# Patient Record
Sex: MALE | Race: WHITE | NOT HISPANIC OR LATINO | Employment: FULL TIME | URBAN - METROPOLITAN AREA
[De-identification: names, ages, dates, MRNs, and addresses within clinical notes are randomized per-mention and may not be internally consistent; named-entity substitution may affect disease eponyms.]

---

## 2017-01-09 DIAGNOSIS — I10 ESSENTIAL HYPERTENSION, BENIGN: Primary | ICD-10-CM

## 2017-01-09 NOTE — TELEPHONE ENCOUNTER
lisinopril-hydrochlorothiazide (PRINZIDE,ZESTORETIC) 20-12.5 MG      Last Written Prescription Date: 6/17/16  Last Fill Quantity: 90, # refills: 1  Last Office Visit with G, P or [facility] prescribing provider: 12/16/15       POTASSIUM   Date Value Ref Range Status   12/16/2015 3.8 3.4 - 5.3 mmol/L Final     CREATININE   Date Value Ref Range Status   12/16/2015 0.95 0.66 - 1.25 mg/dL Final     BP Readings from Last 3 Encounters:   03/24/16 132/74   12/16/15 124/76   04/25/14 138/84

## 2017-01-10 RX ORDER — LISINOPRIL AND HYDROCHLOROTHIAZIDE 12.5; 2 MG/1; MG/1
1 TABLET ORAL DAILY
Qty: 30 TABLET | Refills: 0 | Status: SHIPPED | OUTPATIENT
Start: 2017-01-10 | End: 2017-06-06

## 2017-01-10 NOTE — TELEPHONE ENCOUNTER
Medication is being filled for 1 time refill only due to:  Patient needs to be seen because due for yearly visit..      please call to notify.    Shawnee Hernandez RN  [facility]  [phone]

## 2017-06-06 ENCOUNTER — OFFICE VISIT (OUTPATIENT)
Dept: FAMILY MEDICINE | Facility: CLINIC | Age: 42
End: 2017-06-06
Payer: COMMERCIAL

## 2017-06-06 VITALS
DIASTOLIC BLOOD PRESSURE: 60 MMHG | HEIGHT: 70 IN | SYSTOLIC BLOOD PRESSURE: 118 MMHG | BODY MASS INDEX: 45.1 KG/M2 | HEART RATE: 84 BPM | WEIGHT: 315 LBS | TEMPERATURE: 98.8 F

## 2017-06-06 DIAGNOSIS — E66.01 MORBID OBESITY, UNSPECIFIED OBESITY TYPE (H): Primary | ICD-10-CM

## 2017-06-06 DIAGNOSIS — K92.1 BLOOD IN STOOL: ICD-10-CM

## 2017-06-06 DIAGNOSIS — I10 ESSENTIAL HYPERTENSION, BENIGN: ICD-10-CM

## 2017-06-06 DIAGNOSIS — Z13.6 CARDIOVASCULAR SCREENING; LDL GOAL LESS THAN 160: ICD-10-CM

## 2017-06-06 PROCEDURE — 36415 COLL VENOUS BLD VENIPUNCTURE: CPT | Performed by: FAMILY MEDICINE

## 2017-06-06 PROCEDURE — 99214 OFFICE O/P EST MOD 30 MIN: CPT | Performed by: FAMILY MEDICINE

## 2017-06-06 PROCEDURE — 80061 LIPID PANEL: CPT | Performed by: FAMILY MEDICINE

## 2017-06-06 PROCEDURE — 80053 COMPREHEN METABOLIC PANEL: CPT | Performed by: FAMILY MEDICINE

## 2017-06-06 RX ORDER — LISINOPRIL AND HYDROCHLOROTHIAZIDE 12.5; 2 MG/1; MG/1
1 TABLET ORAL DAILY
Qty: 30 TABLET | Refills: 11 | Status: SHIPPED | OUTPATIENT
Start: 2017-06-06 | End: 2018-06-07

## 2017-06-06 NOTE — NURSING NOTE
"Chief Complaint   Patient presents with     Rectal Problem       Initial /60 (BP Location: Right arm, Patient Position: Chair)  Pulse 84  Temp 98.8  F (37.1  C) (Tympanic)  Ht 5' 10\" (1.778 m)  Wt (!) 432 lb (196 kg)  BMI 61.99 kg/m2 Estimated body mass index is 61.99 kg/(m^2) as calculated from the following:    Height as of this encounter: 5' 10\" (1.778 m).    Weight as of this encounter: 432 lb (196 kg).  Medication Reconciliation: complete    Current Outpatient Prescriptions   Medication Sig Dispense Refill     lamoTRIgine (LAMICTAL) 200 MG tablet 100 mg 2 times daily  5     OXcarbazepine (TRILEPTAL) 300 MG tablet 150 mg 2 times daily  4     IBUPROFEN PO Take  by mouth.       lisinopril-hydrochlorothiazide (PRINZIDE/ZESTORETIC) 20-12.5 MG per tablet Take 1 tablet by mouth daily Due for office visit or further refills (Patient not taking: Reported on 6/6/2017) 30 tablet 0       Giuliano M, CMA  "

## 2017-06-06 NOTE — LETTER
[address] [place] [address]   [phone]      June 8, 2017      Bill Acevedo  [address]   [address] [facility] [address]  [place] [address]            Dear Bill,        I have reviewed your recent labs. Here are the results:     -Liver and gallbladder tests are normal. (ALT,AST, Alk phos, bilirubin), kidney function is normal (Cr, GFR), Sodium is normal, Potassium is normal, Calcium is normal, Glucose is normal (diabetes screening test).     -Cholesterol levels (LDL,HDL,  are normal.  ADVISE: rechecking in 1 year.   Triglycerides are slightly high, work on diet. No new medication at this time.  Enclosed is a copy of the results.  If you have any questions or concerns, please call the clinic at [phone] and make a follow up appointment with me.    Results for orders placed or performed in visit on 06/06/17   Comprehensive metabolic panel   Result Value Ref Range    Sodium 141 133 - 144 mmol/L    Potassium 3.8 3.4 - 5.3 mmol/L    Chloride 109 94 - 109 mmol/L    Carbon Dioxide 23 20 - 32 mmol/L    Anion Gap 9 3 - 14 mmol/L    Glucose 89 70 - 99 mg/dL    Urea Nitrogen 10 7 - 30 mg/dL    Creatinine 0.96 0.66 - 1.25 mg/dL    GFR Estimate 86 >60 mL/min/1.7m2    GFR Estimate If Black >90   GFR Calc   >60 mL/min/1.7m2    Calcium 8.2 (L) 8.5 - 10.1 mg/dL    Bilirubin Total 0.8 0.2 - 1.3 mg/dL    Albumin 3.7 3.4 - 5.0 g/dL    Protein Total 7.5 6.8 - 8.8 g/dL    Alkaline Phosphatase 115 40 - 150 U/L    ALT 66 0 - 70 U/L    AST 26 0 - 45 U/L   Lipid Profile (Chol, Trig, HDL, LDL calc)   Result Value Ref Range    Cholesterol 163 <200 mg/dL    Triglycerides 196 (H) <150 mg/dL    HDL Cholesterol 36 (L) >39 mg/dL    LDL Cholesterol Calculated 88 <100 mg/dL    Non HDL Cholesterol 127 <130 mg/dL       Sincerely,    Andrey Stinson M.D.

## 2017-06-06 NOTE — PROGRESS NOTES
SUBJECTIVE:                                                    Bill Acevedo is a 42 year old male who presents to clinic today for the following health issues:      Concern - blood in stool     Onset: 1 week    initially had gastroenteritis symptoms, no recent antibiotics use. Denies any abdominal pain.    No epigastric tenderness.    Description:   Blood noted on toilet paper     Intensity: moderate    Progression of Symptoms:  waxing and waning    Accompanying Signs & Symptoms:  Recent diarrhea/food poisoning          Therapies Tried and outcome: NONE       Hypertension Follow-up  Out of meds x 6 weeks     Outpatient blood pressures are being checked at home.  Results are 138/90s.    Low Salt Diet: low salt       Problem list and histories reviewed & adjusted, as indicated.  Additional history: as documented    Patient Active Problem List   Diagnosis     Essential hypertension, benign     Ankle instability     Ankle pain     CARDIOVASCULAR SCREENING; LDL GOAL LESS THAN 160     Body mass index 50.0-59.9, adult (H)     Bipolar 2 disorder (H)     Vitamin D deficiency     Elevated fasting glucose     Prediabetes     Morbid obesity (H)     Past Surgical History:   Procedure Laterality Date     BIOPSY      Evaluating drug reaction to lamictal       Social History   Substance Use Topics     Smoking status: Never Smoker     Smokeless tobacco: Never Used     Alcohol use 0.0 oz/week     0 Standard drinks or equivalent per week      Comment: occas     Family History   Problem Relation Age of Onset     DIABETES Maternal Grandmother      Allergies Mother      Allergies Sister      Allergies Maternal Grandmother      Arthritis Maternal Grandmother      CANCER Maternal Grandmother      heart attack         Current Outpatient Prescriptions   Medication Sig Dispense Refill     lisinopril-hydrochlorothiazide (PRINZIDE/ZESTORETIC) 20-12.5 MG per tablet Take 1 tablet by mouth daily 30 tablet 11     hydrocortisone (ANUSOL-HC)  "2.5 % cream Place rectally 2 times daily 30 g 0     lamoTRIgine (LAMICTAL) 200 MG tablet 100 mg 2 times daily  5     OXcarbazepine (TRILEPTAL) 300 MG tablet 150 mg 2 times daily  4     IBUPROFEN PO Take  by mouth.       [DISCONTINUED] lisinopril-hydrochlorothiazide (PRINZIDE/ZESTORETIC) 20-12.5 MG per tablet Take 1 tablet by mouth daily Due for office visit or further refills (Patient not taking: Reported on 6/6/2017) 30 tablet 0       Reviewed and updated as needed this visit by clinical staff  Tobacco  Allergies  Meds  Soc Hx      Reviewed and updated as needed this visit by Provider         ROS:  C: NEGATIVE for fever, chills, change in weight  R: NEGATIVE for significant cough or SOB  CV: NEGATIVE for chest pain, palpitations or peripheral edema  GI: POSITIVE for blood in stools    OBJECTIVE:                                                    /60 (BP Location: Right arm, Patient Position: Chair)  Pulse 84  Temp 98.8  F (37.1  C) (Tympanic)  Ht 5' 10\" (1.778 m)  Wt (!) 432 lb (196 kg)  BMI 61.99 kg/m2  Body mass index is 61.99 kg/(m^2).   GENERAL: healthy, alert, well nourished, well hydrated, no distress  ABDOMEN: soft, no tenderness, no  hepatosplenomegaly, no masses, normal bowel sounds  Rectal exam done, no active bleeding, no external Hemorid, rectal tone normal no visible fissure seen.     ASSESSMENT/PLAN:                                                        ICD-10-CM    1. Morbid obesity, unspecified obesity type (H) E66.01 Lipid Profile (Chol, Trig, HDL, LDL calc)   2. Body mass index 50.0-59.9, adult (H) Z68.43    3. Essential hypertension, benign I10 Comprehensive metabolic panel     lisinopril-hydrochlorothiazide (PRINZIDE/ZESTORETIC) 20-12.5 MG per tablet   4. CARDIOVASCULAR SCREENING; LDL GOAL LESS THAN 160 Z13.6    5. Blood in stool K92.1 hydrocortisone (ANUSOL-HC) 2.5 % cream     COLORECTAL SURGERY REFERRAL       Bp stable, he is out of his medication, ordered labs, medication " filled.  Blood in stools, his rectal exam to the extent is normal, differential dicussed, could be small fissure not visible on my exam or internal Hemmorid, advice increase fiber in diet, avoid constipation.  Try Anusol cream to see if it helps with irritation. If this continue follow up with the colon rectal surgery for further evaluation.    Andrey Stinson MD  [facility]

## 2017-06-06 NOTE — MR AVS SNAPSHOT
After Visit Summary   6/6/2017    Bill Acevedo    MRN: [id number]           Patient Information     Date Of Birth          1975        Visit Information        Provider Department      6/6/2017 3:20 PM Andrey Stinson MD [facility] [place]        Today's Diagnoses     Morbid obesity, unspecified obesity type (H)    -  1    Body mass index 50.0-59.9, adult (H)        Essential hypertension, benign        CARDIOVASCULAR SCREENING; LDL GOAL LESS THAN 160        Blood in stool           Follow-ups after your visit        Additional Services     COLORECTAL SURGERY REFERRAL       Your provider has referred you to: FM[facility]: [facility] [phone]   [email or web address]    Referral Reason(s): Rectal Bleeding  Special Concerns: None  This referral is: Elective (week +)  It is OK to leave a message on patient's voicemail.    Please be aware that coverage of these services is subject to the terms and limitations of your health insurance plan.  Call member services at your health plan with any benefit or coverage questions.      Please bring the following with you to your appointment:    (1) Any X-Rays, CTs or MRIs which have been performed.  Contact the facility where they were done to arrange for  prior to your scheduled appointment.    (2) List of current medications  (3) This referral request   (4) Any documents/labs given to you for this referral                  Who to contact     If you have questions or need follow up information about today's clinic visit or your schedule please contact [facility] [place] directly at [phone].  Normal or non-critical lab and imaging results will be communicated to you by MyChart, letter or phone within 4 business days after the clinic has received the results. If you do not hear from us within 7 days, please contact the clinic through MyChart or phone. If you  "have a critical or abnormal lab result, we will notify you by phone as soon as possible.  Submit refill requests through [employer] or call your pharmacy and they will forward the refill request to us. Please allow 3 business days for your refill to be completed.          Additional Information About Your Visit        Revealhart Information     [employer] gives you secure access to your electronic health record. If you see a primary care provider, you can also send messages to your care team and make appointments. If you have questions, please call your primary care clinic.  If you do not have a primary care provider, please call [phone] and they will assist you.        Care EveryWhere ID     This is your Care EveryWhere ID. This could be used by other organizations to access your [facility] medical records  [id number]        Your Vitals Were     Pulse Temperature Height BMI (Body Mass Index)          84 98.8  F (37.1  C) (Tympanic) 5' 10\" (1.778 m) 61.99 kg/m2         Blood Pressure from Last 3 Encounters:   06/06/17 118/60   03/24/16 132/74   12/16/15 124/76    Weight from Last 3 Encounters:   06/06/17 (!) 432 lb (196 kg)   03/24/16 (!) 420 lb (190.5 kg)   12/16/15 (!) 415 lb (188.2 kg)              We Performed the Following     COLORECTAL SURGERY REFERRAL     Comprehensive metabolic panel     Lipid Profile (Chol, Trig, HDL, LDL calc)          Today's Medication Changes          These changes are accurate as of: 6/6/17  3:49 PM.  If you have any questions, ask your nurse or doctor.               Start taking these medicines.        Dose/Directions    hydrocortisone 2.5 % cream   Commonly known as:  ANUSOL-HC   Used for:  Blood in stool   Started by:  Andrey Stinson MD        Place rectally 2 times daily   Quantity:  30 g   Refills:  0         These medicines have changed or have updated prescriptions.        Dose/Directions    lisinopril-hydrochlorothiazide 20-12.5 MG per tablet   Commonly known as:  " PRINZIDE/ZESTORETIC   This may have changed:  additional instructions   Used for:  Essential hypertension, benign   Changed by:  Andrey Stinson MD        Dose:  1 tablet   Take 1 tablet by mouth daily   Quantity:  30 tablet   Refills:  11            Where to get your medicines      These medications were sent to [id number] - [place], [place] - [address] [facility]  [address] [place] [id number]     Phone:  [phone]     hydrocortisone 2.5 % cream    lisinopril-hydrochlorothiazide 20-12.5 MG per tablet                Primary Care Provider Office Phone # Fax #    Andrey Stinson [phone]       [facility] [address] [place] [address] [address]  [place] [address]        Thank you!     Thank you for choosing [facility]  for your care. Our goal is always to provide you with excellent care. Hearing back from our patients is one way we can continue to improve our services. Please take a few minutes to complete the written survey that you may receive in the mail after your visit with us. Thank you!             Your Updated Medication List - Protect others around you: Learn how to safely use, store and throw away your medicines at www.disposemymeds.org.          This list is accurate as of: 6/6/17  3:49 PM.  Always use your most recent med list.                   Brand Name Dispense Instructions for use    hydrocortisone 2.5 % cream    ANUSOL-HC    30 g    Place rectally 2 times daily       IBUPROFEN PO      Take  by mouth.       lamoTRIgine 200 MG tablet    LaMICtal     100 mg 2 times daily       lisinopril-hydrochlorothiazide 20-12.5 MG per tablet    PRINZIDE/ZESTORETIC    30 tablet    Take 1 tablet by mouth daily       OXcarbazepine 300 MG tablet    TRILEPTAL     150 mg 2 times daily

## 2017-06-07 LAB
ALBUMIN SERPL-MCNC: 3.7 G/DL (ref 3.4–5)
ALP SERPL-CCNC: 115 U/L (ref 40–150)
ALT SERPL W P-5'-P-CCNC: 66 U/L (ref 0–70)
ANION GAP SERPL CALCULATED.3IONS-SCNC: 9 MMOL/L (ref 3–14)
AST SERPL W P-5'-P-CCNC: 26 U/L (ref 0–45)
BILIRUB SERPL-MCNC: 0.8 MG/DL (ref 0.2–1.3)
BUN SERPL-MCNC: 10 MG/DL (ref 7–30)
CALCIUM SERPL-MCNC: 8.2 MG/DL (ref 8.5–10.1)
CHLORIDE SERPL-SCNC: 109 MMOL/L (ref 94–109)
CHOLEST SERPL-MCNC: 163 MG/DL
CO2 SERPL-SCNC: 23 MMOL/L (ref 20–32)
CREAT SERPL-MCNC: 0.96 MG/DL (ref 0.66–1.25)
GFR SERPL CREATININE-BSD FRML MDRD: 86 ML/MIN/1.7M2
GLUCOSE SERPL-MCNC: 89 MG/DL (ref 70–99)
HDLC SERPL-MCNC: 36 MG/DL
LDLC SERPL CALC-MCNC: 88 MG/DL
NONHDLC SERPL-MCNC: 127 MG/DL
POTASSIUM SERPL-SCNC: 3.8 MMOL/L (ref 3.4–5.3)
PROT SERPL-MCNC: 7.5 G/DL (ref 6.8–8.8)
SODIUM SERPL-SCNC: 141 MMOL/L (ref 133–144)
TRIGL SERPL-MCNC: 196 MG/DL

## 2018-06-07 DIAGNOSIS — I10 ESSENTIAL HYPERTENSION, BENIGN: ICD-10-CM

## 2018-06-07 RX ORDER — LISINOPRIL AND HYDROCHLOROTHIAZIDE 12.5; 2 MG/1; MG/1
TABLET ORAL
Qty: 30 TABLET | Refills: 0 | Status: SHIPPED | OUTPATIENT
Start: 2018-06-07 | End: 2018-07-11

## 2018-06-07 NOTE — TELEPHONE ENCOUNTER
Medication is being filled for 1 time refill only due to:  Patient needs to be seen because it has been more than one year since last visit.     Caryn Rosen RN- Triage [employer]

## 2018-07-11 ENCOUNTER — OFFICE VISIT (OUTPATIENT)
Dept: FAMILY MEDICINE | Facility: CLINIC | Age: 43
End: 2018-07-11
Payer: COMMERCIAL

## 2018-07-11 VITALS
TEMPERATURE: 98.2 F | OXYGEN SATURATION: 96 % | HEART RATE: 92 BPM | SYSTOLIC BLOOD PRESSURE: 128 MMHG | BODY MASS INDEX: 45.1 KG/M2 | DIASTOLIC BLOOD PRESSURE: 74 MMHG | WEIGHT: 315 LBS | HEIGHT: 70 IN

## 2018-07-11 DIAGNOSIS — E66.01 MORBID OBESITY (H): ICD-10-CM

## 2018-07-11 DIAGNOSIS — Z13.6 CARDIOVASCULAR SCREENING; LDL GOAL LESS THAN 160: ICD-10-CM

## 2018-07-11 DIAGNOSIS — Z00.00 ENCOUNTER FOR ANNUAL PHYSICAL EXAM: Primary | ICD-10-CM

## 2018-07-11 DIAGNOSIS — F31.81 BIPOLAR 2 DISORDER (H): ICD-10-CM

## 2018-07-11 DIAGNOSIS — R73.03 PREDIABETES: ICD-10-CM

## 2018-07-11 DIAGNOSIS — I10 ESSENTIAL HYPERTENSION, BENIGN: ICD-10-CM

## 2018-07-11 PROCEDURE — 80061 LIPID PANEL: CPT | Performed by: FAMILY MEDICINE

## 2018-07-11 PROCEDURE — 36415 COLL VENOUS BLD VENIPUNCTURE: CPT | Performed by: FAMILY MEDICINE

## 2018-07-11 PROCEDURE — 80053 COMPREHEN METABOLIC PANEL: CPT | Performed by: FAMILY MEDICINE

## 2018-07-11 PROCEDURE — 99396 PREV VISIT EST AGE 40-64: CPT | Performed by: FAMILY MEDICINE

## 2018-07-11 RX ORDER — LISINOPRIL AND HYDROCHLOROTHIAZIDE 12.5; 2 MG/1; MG/1
1 TABLET ORAL DAILY
Qty: 30 TABLET | Refills: 11 | Status: SHIPPED | OUTPATIENT
Start: 2018-07-11 | End: 2018-07-20

## 2018-07-11 NOTE — LETTER
July 17, 2018      Bill Acevedo  [address]  [place] [address]        Dear ,      I have reviewed your recent labs. Here are the results:     -Liver and gallbladder tests are normal. (ALT,AST, Alk phos, bilirubin), kidney function is normal (Cr, GFR), Sodium is normal, Potassium is normal, Calcium is normal, Glucose is normal (diabetes screening test).   -Cholesterol levels (LDL,HDL, Triglycerides) are stable.  Triglycerides are mildly elevated.  Your LDL is also  under the goal..  ADVISE: rechecking in 1 year.     Resulted Orders   Lipid panel reflex to direct LDL Fasting   Result Value Ref Range    Cholesterol 178 <200 mg/dL    Triglycerides 174 (H) <150 mg/dL      Comment:      Borderline high:  150-199 mg/dl  High:             200-499 mg/dl  Very high:       >499 mg/dl      HDL Cholesterol 40 >39 mg/dL    LDL Cholesterol Calculated 103 (H) <100 mg/dL      Comment:      Above desirable:  100-129 mg/dl  Borderline High:  130-159 mg/dL  High:             160-189 mg/dL  Very high:       >189 mg/dl      Non HDL Cholesterol 138 (H) <130 mg/dL      Comment:      Above Desirable:  130-159 mg/dl  Borderline high:  160-189 mg/dl  High:             190-219 mg/dl  Very high:       >219 mg/dl     Comprehensive metabolic panel   Result Value Ref Range    Sodium 136 133 - 144 mmol/L    Potassium 3.9 3.4 - 5.3 mmol/L    Chloride 102 94 - 109 mmol/L    Carbon Dioxide 25 20 - 32 mmol/L    Anion Gap 9 3 - 14 mmol/L    Glucose 98 70 - 99 mg/dL    Urea Nitrogen 14 7 - 30 mg/dL    Creatinine 1.03 0.66 - 1.25 mg/dL    GFR Estimate 79 >60 mL/min/1.7m2      Comment:      Non  GFR Calc    GFR Estimate If Black >90 >60 mL/min/1.7m2      Comment:       GFR Calc    Calcium 8.0 (L) 8.5 - 10.1 mg/dL    Bilirubin Total 0.7 0.2 - 1.3 mg/dL    Albumin 3.7 3.4 - 5.0 g/dL    Protein Total 7.3 6.8 - 8.8 g/dL    Alkaline Phosphatase 114 40 - 150 U/L    ALT 69 0 - 70 U/L    AST 23 0  - 45 U/L       If you have any questions or concerns, please call the clinic at the number listed above.       Sincerely,    Andrey Stinson MD

## 2018-07-11 NOTE — PROGRESS NOTES
SUBJECTIVE:   CC: Bill Acevedo is an 43 year old male who presents for preventative health visit.     Physical   Annual:     Getting at least 3 servings of Calcium per day:  Yes    Bi-annual eye exam:  NO    Dental care twice a year:  NO    Sleep apnea or symptoms of sleep apnea:  None    Diet:  Regular (no restrictions)    Frequency of exercise:  6-7 days/week    Duration of exercise:  45-60 minutes    Taking medications regularly:  Yes    Medication side effects:  Not applicable    Additional concerns today:  YES        Patient has history of morbid obesity along with bipolar disorder.  His diet has been not great and sentry life style.  Patient has been slight busy at home with her mother who  needs some medical attention.    Today's PHQ-2 Score:   PHQ-2 ( 1999 [employer]) 7/6/2018   Q1: Little interest or pleasure in doing things 1   Q2: Feeling down, depressed or hopeless 1   PHQ-2 Score 2   Q1: Little interest or pleasure in doing things Several days   Q2: Feeling down, depressed or hopeless Several days   PHQ-2 Score 2       Abuse: Current or Past(Physical, Sexual or Emotional)- No  Do you feel safe in your environment - Yes    Social History   Substance Use Topics     Smoking status: Never Smoker     Smokeless tobacco: Never Used     Alcohol use 0.0 oz/week     0 Standard drinks or equivalent per week      Comment: occas     Alcohol Use 7/6/2018   If you drink alcohol do you typically have greater than 3 drinks per day OR greater than 7 drinks per week? No   No flowsheet data found.    Last PSA: No results found for: PSA    Reviewed orders with patient. Reviewed health maintenance and updated orders accordingly -       Reviewed and updated as needed this visit by clinical staff  Allergies  Meds         Reviewed and updated as needed this visit by Provider         Review of Systems  CONSTITUTIONAL: NEGATIVE for fever, chills, change in weight  INTEGUMENTARY/SKIN: NEGATIVE for worrisome rashes, moles or  lesions  EYES: NEGATIVE for vision changes or irritation  ENT: NEGATIVE for ear, mouth and throat problems  RESP: NEGATIVE for significant cough or SOB  CV: NEGATIVE for chest pain, palpitations or peripheral edema  GI: NEGATIVE for nausea, abdominal pain, heartburn, or change in bowel habits   male: negative for dysuria, hematuria, decreased urinary stream, erectile dysfunction, urethral discharge  MUSCULOSKELETAL: NEGATIVE for significant arthralgias or myalgia  NEURO: NEGATIVE for weakness, dizziness or paresthesias  PSYCHIATRIC: NEGATIVE for changes in mood or affect    OBJECTIVE:   There were no vitals taken for this visit.    Physical Exam  GENERAL: healthy, alert and no distress  EYES: Eyes grossly normal to inspection, PERRL and conjunctivae and sclerae normal  HENT: ear canals and TM's normal, nose and mouth without ulcers or lesions  NECK: no adenopathy, no asymmetry, masses, or scars and thyroid normal to palpation  RESP: lungs clear to auscultation - no rales, rhonchi or wheezes  CV: regular rate and rhythm, normal S1 S2, no S3 or S4, no murmur, click or rub, no peripheral edema and peripheral pulses strong  ABDOMEN: soft, nontender, no hepatosplenomegaly, no masses and bowel sounds normal  MS: no gross musculoskeletal defects noted, no edema  SKIN: no suspicious lesions or rashes  NEURO: Normal strength and tone, mentation intact and speech normal  PSYCH: mentation appears normal, affect normal/bright    Diagnostic Test Results:  none     ASSESSMENT/PLAN:   1. Encounter for annual physical exam    - Lipid panel reflex to direct LDL Fasting  - Comprehensive metabolic panel    2. Morbid obesity (H)  I suggested patient to get a bariatric evaluation to see if there is any possibility.  Referral provided.  - Lipid panel reflex to direct LDL Fasting  - Comprehensive metabolic panel  - BARIATRIC ADULT REFERRAL    3. Essential hypertension, benign  Heart pressure is stable.  I have refilled his  "medication.  - Lipid panel reflex to direct LDL Fasting  - Comprehensive metabolic panel  - lisinopril-hydrochlorothiazide (PRINZIDE/ZESTORETIC) 20-12.5 MG per tablet; Take 1 tablet by mouth daily  Dispense: 30 tablet; Refill: 11    4. Prediabetes    - Lipid panel reflex to direct LDL Fasting  - Comprehensive metabolic panel    5. Bipolar 2 disorder (H)      6. CARDIOVASCULAR SCREENING; LDL GOAL LESS THAN 160        COUNSELING:   Reviewed preventive health counseling, as reflected in patient instructions       Regular exercise       Healthy diet/nutrition    BP Readings from Last 1 Encounters:   06/06/17 118/60     Estimated body mass index is 61.99 kg/(m^2) as calculated from the following:    Height as of 6/6/17: 5' 10\" (1.778 m).    Weight as of 6/6/17: 432 lb (196 kg).      Weight management plan: Patient referred to endocrine and/or weight management specialty     reports that he has never smoked. He has never used smokeless tobacco.      Counseling Resources:  ATP IV Guidelines  Pooled Cohorts Equation Calculator  FRAX Risk Assessment  ICSI Preventive Guidelines  Dietary Guidelines for Americans, 2010  [employer]'s MyPlate  ASA Prophylaxis  Lung CA Screening    Andrey Stinson MD  [facility]  "

## 2018-07-11 NOTE — MR AVS SNAPSHOT
After Visit Summary   7/11/2018    Bill Acevedo    MRN: [id number]           Patient Information     Date Of Birth          1975        Visit Information        Provider Department      7/11/2018 4:40 PM Andrey Stinson MD [facility] [place]        Today's Diagnoses     Encounter for annual physical exam    -  1    Morbid obesity (H)        Essential hypertension, benign        Prediabetes        Bipolar 2 disorder (H)        CARDIOVASCULAR SCREENING; LDL GOAL LESS THAN 160           Follow-ups after your visit        Additional Services     BARIATRIC ADULT REFERRAL       Your provider has referred you to: FM[facility]: [facility] [place] [facility] Lea Elizondo [phone]  [email or web address]    Please be aware that coverage of these services is subject to the terms and limitations of your health insurance plan.  Call member services at your health plan with any benefit or coverage questions.      Please bring the following with you to your appointment:      (1) List of current medications   (2) This referral request   (3) Any documents/labs given to you for this referral                  Follow-up notes from your care team     Return in about 1 year (around 7/11/2019) for Physical Exam.      Who to contact     If you have questions or need follow up information about today's clinic visit or your schedule please contact [facility] [place] directly at [phone].  Normal or non-critical lab and imaging results will be communicated to you by MyChart, letter or phone within 4 business days after the clinic has received the results. If you do not hear from us within 7 days, please contact the clinic through MyChart or phone. If you have a critical or abnormal lab result, we will notify you by phone as soon as possible.  Submit refill requests through sarvaMAIL or call your pharmacy and  "they will forward the refill request to us. Please allow 3 business days for your refill to be completed.          Additional Information About Your Visit        [employer] G[employer] Information     [employer] gives you secure access to your electronic health record. If you see a primary care provider, you can also send messages to your care team and make appointments. If you have questions, please call your primary care clinic.  If you do not have a primary care provider, please call [phone] and they will assist you.        Care EveryWhere ID     This is your Care EveryWhere ID. This could be used by other organizations to access your [place] medical records  [id number]        Your Vitals Were     Pulse Temperature Height Pulse Oximetry BMI (Body Mass Index)       92 98.2  F (36.8  C) (Tympanic) 5' 10\" (1.778 m) 96% 62.99 kg/m2        Blood Pressure from Last 3 Encounters:   07/11/18 128/74   06/06/17 118/60   03/24/16 132/74    Weight from Last 3 Encounters:   07/11/18 (!) 439 lb (199.1 kg)   06/06/17 (!) 432 lb (196 kg)   03/24/16 (!) 420 lb (190.5 kg)              We Performed the Following     BARIATRIC ADULT REFERRAL     Comprehensive metabolic panel     Lipid panel reflex to direct LDL Fasting          Today's Medication Changes          These changes are accurate as of 7/11/18  5:14 PM.  If you have any questions, ask your nurse or doctor.               These medicines have changed or have updated prescriptions.        Dose/Directions    lisinopril-hydrochlorothiazide 20-12.5 MG per tablet   Commonly known as:  PRINZIDE/ZESTORETIC   This may have changed:  See the new instructions.   Used for:  Essential hypertension, benign   Changed by:  Andrey Stinson MD        Dose:  1 tablet   Take 1 tablet by mouth daily   Quantity:  30 tablet   Refills:  11            Where to get your medicines      These medications were sent to [id number] - [facility] [address] " [place] [address]     Phone:  [phone]     lisinopril-hydrochlorothiazide 20-12.5 MG per tablet                Primary Care Provider Office Phone # Fax #    Andrey Stinson [phone]       [address]  [place] [address]        Equal Access to Services     [facility]: Hadii aad ku hadasho Soomaali, waaxda luqadaha, qaybta kaalmada adeegyada, waxay idiin hayaan adeeg kharash la'aan . So [facility] [phone].    ATENCIÓN: Si habla español, tiene a newell disposición servicios gratuitos de asistencia lingüística. Llame al [phone].    We comply with applicable federal civil rights laws and [place] laws. We do not discriminate on the basis of race, color, national origin, age, disability, sex, sexual orientation, or gender identity.            Thank you!     Thank you for choosing [facility] [place]  for your care. Our goal is always to provide you with excellent care. Hearing back from our patients is one way we can continue to improve our services. Please take a few minutes to complete the written survey that you may receive in the mail after your visit with us. Thank you!             Your Updated Medication List - Protect others around you: Learn how to safely use, store and throw away your medicines at www.disposemymeds.org.          This list is accurate as of 7/11/18  5:14 PM.  Always use your most recent med list.                   Brand Name Dispense Instructions for use Diagnosis    IBUPROFEN PO      Take  by mouth.        lamoTRIgine 200 MG tablet    LaMICtal     100 mg 2 times daily        lisinopril-hydrochlorothiazide 20-12.5 MG per tablet    PRINZIDE/ZESTORETIC    30 tablet    Take 1 tablet by mouth daily    Essential hypertension, benign       OXcarbazepine 300 MG tablet    TRILEPTAL     150 mg 2 times daily

## 2018-07-12 LAB
ALBUMIN SERPL-MCNC: 3.7 G/DL (ref 3.4–5)
ALP SERPL-CCNC: 114 U/L (ref 40–150)
ALT SERPL W P-5'-P-CCNC: 69 U/L (ref 0–70)
ANION GAP SERPL CALCULATED.3IONS-SCNC: 9 MMOL/L (ref 3–14)
AST SERPL W P-5'-P-CCNC: 23 U/L (ref 0–45)
BILIRUB SERPL-MCNC: 0.7 MG/DL (ref 0.2–1.3)
BUN SERPL-MCNC: 14 MG/DL (ref 7–30)
CALCIUM SERPL-MCNC: 8 MG/DL (ref 8.5–10.1)
CHLORIDE SERPL-SCNC: 102 MMOL/L (ref 94–109)
CHOLEST SERPL-MCNC: 178 MG/DL
CO2 SERPL-SCNC: 25 MMOL/L (ref 20–32)
CREAT SERPL-MCNC: 1.03 MG/DL (ref 0.66–1.25)
GFR SERPL CREATININE-BSD FRML MDRD: 79 ML/MIN/1.7M2
GLUCOSE SERPL-MCNC: 98 MG/DL (ref 70–99)
HDLC SERPL-MCNC: 40 MG/DL
LDLC SERPL CALC-MCNC: 103 MG/DL
NONHDLC SERPL-MCNC: 138 MG/DL
POTASSIUM SERPL-SCNC: 3.9 MMOL/L (ref 3.4–5.3)
PROT SERPL-MCNC: 7.3 G/DL (ref 6.8–8.8)
SODIUM SERPL-SCNC: 136 MMOL/L (ref 133–144)
TRIGL SERPL-MCNC: 174 MG/DL

## 2018-07-16 DIAGNOSIS — I10 ESSENTIAL HYPERTENSION, BENIGN: ICD-10-CM

## 2018-07-16 RX ORDER — LISINOPRIL AND HYDROCHLOROTHIAZIDE 12.5; 2 MG/1; MG/1
1 TABLET ORAL DAILY
Qty: 30 TABLET | Refills: 11 | Status: CANCELLED | OUTPATIENT
Start: 2018-07-16

## 2018-07-16 NOTE — TELEPHONE ENCOUNTER
"Requested Prescriptions   Pending Prescriptions Disp Refills     lisinopril-hydrochlorothiazide (PRINZIDE/ZESTORETIC) 20-12.5 MG per tablet  Last Written Prescription Date:  7/1/18  Last Fill Quantity: 30,  # refills: 11   Last office visit: 7/11/2018 with prescribing provider:  Shantell   Future Office Visit:     30 tablet 11     Sig: Take 1 tablet by mouth daily    Diuretics (Including Combos) Protocol Passed    7/16/2018 11:13 AM       Passed - Blood pressure under 140/90 in past 12 months    BP Readings from Last 3 Encounters:   07/11/18 128/74   06/06/17 118/60   03/24/16 132/74                Passed - Recent (12 mo) or future (30 days) visit within the authorizing provider's specialty    Patient had office visit in the last 12 months or has a visit in the next 30 days with authorizing provider or within the authorizing provider's specialty.  See \"Patient Info\" tab in inbasket, or \"Choose Columns\" in Meds & Orders section of the refill encounter.           Passed - Patient is age 18 or older       Passed - Normal serum creatinine on file in past 12 months    Recent Labs   Lab Test  07/11/18   1701   CR  1.03             Passed - Normal serum potassium on file in past 12 months    Recent Labs   Lab Test  07/11/18   1701   POTASSIUM  3.9                   Passed - Normal serum sodium on file in past 12 months    Recent Labs   Lab Test  07/11/18   1701   NA  136                "

## 2018-07-16 NOTE — TELEPHONE ENCOUNTER
"Requested Prescriptions   Pending Prescriptions Disp Refills     lisinopril-hydrochlorothiazide (PRINZIDE/ZESTORETIC) 20-12.5 MG per tablet  Last Written Prescription Date:  7-  Last Fill Quantity: 30 tablet,  # refills: 11   Last office visit: 7/11/2018 with prescribing provider:  7-  Future Office Visit:     30 tablet 11     Sig: Take 1 tablet by mouth daily    Diuretics (Including Combos) Protocol Passed    7/16/2018  2:21 PM       Passed - Blood pressure under 140/90 in past 12 months    BP Readings from Last 3 Encounters:   07/11/18 128/74   06/06/17 118/60   03/24/16 132/74                Passed - Recent (12 mo) or future (30 days) visit within the authorizing provider's specialty    Patient had office visit in the last 12 months or has a visit in the next 30 days with authorizing provider or within the authorizing provider's specialty.  See \"Patient Info\" tab in inbasket, or \"Choose Columns\" in Meds & Orders section of the refill encounter.           Passed - Patient is age 18 or older       Passed - Normal serum creatinine on file in past 12 months    Recent Labs   Lab Test  07/11/18   1701   CR  1.03             Passed - Normal serum potassium on file in past 12 months    Recent Labs   Lab Test  07/11/18   1701   POTASSIUM  3.9                   Passed - Normal serum sodium on file in past 12 months    Recent Labs   Lab Test  07/11/18   1701   NA  136                "

## 2018-07-17 NOTE — TELEPHONE ENCOUNTER
Patient has refills remaining with pharmacy.  Lorin Akhtar RN - Triage  [facility]

## 2018-07-20 DIAGNOSIS — I10 ESSENTIAL HYPERTENSION, BENIGN: ICD-10-CM

## 2018-07-20 NOTE — TELEPHONE ENCOUNTER
"Requested Prescriptions   Pending Prescriptions Disp Refills     lisinopril-hydrochlorothiazide (PRINZIDE/ZESTORETIC) 20-12.5 MG per tablet 30 tablet 11    Last Written Prescription Date:  7/11/18  Last Fill Quantity: 30,  # refills: 11   Last office visit: 7/11/2018 with prescribing provider:  YES    Future Office Visit:     Sig: Take 1 tablet by mouth daily    Diuretics (Including Combos) Protocol Passed    7/20/2018 10:19 AM       Passed - Blood pressure under 140/90 in past 12 months    BP Readings from Last 3 Encounters:   07/11/18 128/74   06/06/17 118/60   03/24/16 132/74                Passed - Recent (12 mo) or future (30 days) visit within the authorizing provider's specialty    Patient had office visit in the last 12 months or has a visit in the next 30 days with authorizing provider or within the authorizing provider's specialty.  See \"Patient Info\" tab in inbasket, or \"Choose Columns\" in Meds & Orders section of the refill encounter.           Passed - Patient is age 18 or older       Passed - Normal serum creatinine on file in past 12 months    Recent Labs   Lab Test  07/11/18   1701   CR  1.03             Passed - Normal serum potassium on file in past 12 months    Recent Labs   Lab Test  07/11/18   1701   POTASSIUM  3.9                   Passed - Normal serum sodium on file in past 12 months    Recent Labs   Lab Test  07/11/18   1701   NA  136                "

## 2018-07-22 RX ORDER — LISINOPRIL AND HYDROCHLOROTHIAZIDE 12.5; 2 MG/1; MG/1
1 TABLET ORAL DAILY
Qty: 90 TABLET | Refills: 3 | Status: SHIPPED | OUTPATIENT
Start: 2018-07-22 | End: 2019-09-09

## 2018-11-06 ENCOUNTER — OFFICE VISIT (OUTPATIENT)
Dept: ORTHOPEDICS | Facility: CLINIC | Age: 43
End: 2018-11-06
Payer: COMMERCIAL

## 2018-11-06 ENCOUNTER — RADIANT APPOINTMENT (OUTPATIENT)
Dept: GENERAL RADIOLOGY | Facility: CLINIC | Age: 43
End: 2018-11-06
Attending: FAMILY MEDICINE
Payer: COMMERCIAL

## 2018-11-06 VITALS
SYSTOLIC BLOOD PRESSURE: 128 MMHG | WEIGHT: 315 LBS | DIASTOLIC BLOOD PRESSURE: 74 MMHG | HEIGHT: 70 IN | BODY MASS INDEX: 45.1 KG/M2

## 2018-11-06 DIAGNOSIS — M17.11 PRIMARY OSTEOARTHRITIS OF RIGHT KNEE: ICD-10-CM

## 2018-11-06 DIAGNOSIS — M25.562 ACUTE PAIN OF LEFT KNEE: ICD-10-CM

## 2018-11-06 DIAGNOSIS — M25.562 ACUTE PAIN OF LEFT KNEE: Primary | ICD-10-CM

## 2018-11-06 DIAGNOSIS — M17.12 PRIMARY OSTEOARTHRITIS OF LEFT KNEE: ICD-10-CM

## 2018-11-06 PROCEDURE — 99203 OFFICE O/P NEW LOW 30 MIN: CPT | Mod: 25 | Performed by: FAMILY MEDICINE

## 2018-11-06 PROCEDURE — 73562 X-RAY EXAM OF KNEE 3: CPT | Mod: FY | Performed by: FAMILY MEDICINE

## 2018-11-06 PROCEDURE — 20610 DRAIN/INJ JOINT/BURSA W/O US: CPT | Mod: 50 | Performed by: FAMILY MEDICINE

## 2018-11-06 RX ORDER — LIDOCAINE HYDROCHLORIDE 10 MG/ML
4 INJECTION, SOLUTION INFILTRATION; PERINEURAL
Status: SHIPPED | OUTPATIENT
Start: 2018-11-06

## 2018-11-06 RX ORDER — METHYLPREDNISOLONE ACETATE 40 MG/ML
40 INJECTION, SUSPENSION INTRA-ARTICULAR; INTRALESIONAL; INTRAMUSCULAR; SOFT TISSUE
Status: SHIPPED | OUTPATIENT
Start: 2018-11-06

## 2018-11-06 RX ADMIN — LIDOCAINE HYDROCHLORIDE 4 ML: 10 INJECTION, SOLUTION INFILTRATION; PERINEURAL at 17:07

## 2018-11-06 RX ADMIN — METHYLPREDNISOLONE ACETATE 40 MG: 40 INJECTION, SUSPENSION INTRA-ARTICULAR; INTRALESIONAL; INTRAMUSCULAR; SOFT TISSUE at 17:07

## 2018-11-06 NOTE — MR AVS SNAPSHOT
"              After Visit Summary   11/6/2018    Bill Acevedo    MRN: [id number]           Patient Information     Date Of Birth          1975        Visit Information        Provider Department      11/6/2018 4:40 PM Shin Gee MD [facility] [place]        Today's Diagnoses     Acute pain of left knee    -  1    Primary osteoarthritis of left knee        Primary osteoarthritis of right knee           Follow-ups after your visit        Who to contact     If you have questions or need follow up information about today's clinic visit or your schedule please contact [facility] [place] directly at [phone].  Normal or non-critical lab and imaging results will be communicated to you by MyChart, letter or phone within 4 business days after the clinic has received the results. If you do not hear from us within 7 days, please contact the clinic through [facility] Homebasehart or phone. If you have a critical or abnormal lab result, we will notify you by phone as soon as possible.  Submit refill requests through MyNewPlace or call your pharmacy and they will forward the refill request to us. Please allow 3 business days for your refill to be completed.          Additional Information About Your Visit        MyChart Information     [employer] gives you secure access to your electronic health record. If you see a primary care provider, you can also send messages to your care team and make appointments. If you have questions, please call your primary care clinic.  If you do not have a primary care provider, please call [phone] and they will assist you.        Care EveryWhere ID     This is your Care EveryWhere ID. This could be used by other organizations to access your [place] medical records  [id number]        Your Vitals Were     Height BMI (Body Mass Index)                5' 10\" (1.778 m) 62.99 kg/m2           Blood Pressure from Last 3 Encounters: "   11/06/18 128/74   07/11/18 128/74   06/06/17 118/60    Weight from Last 3 Encounters:   11/06/18 (!) 439 lb (199.1 kg)   07/11/18 (!) 439 lb (199.1 kg)   06/06/17 (!) 432 lb (196 kg)              We Performed the Following     Large Joint Injection/Arthocentesis        Primary Care Provider Office Phone # Fax #    Andrey Stinson [phone]       [address]  [place] [address]        Equal Access to Services     [facility]: Hadii aad ku hadasho Soomaali, waaxda luqadaha, qaybta kaalmada adeegyada, myah jaimes . So [facility] [phone].    ATENCIÓN: Si habla español, tiene a newell disposición servicios gratuitos de asistencia lingüística. [facility] [phone].    We comply with applicable federal civil rights laws and [place] laws. We do not discriminate on the basis of race, color, national origin, age, disability, sex, sexual orientation, or gender identity.            Thank you!     Thank you for choosing [facility] [place]  for your care. Our goal is always to provide you with excellent care. Hearing back from our patients is one way we can continue to improve our services. Please take a few minutes to complete the written survey that you may receive in the mail after your visit with us. Thank you!             Your Updated Medication List - Protect others around you: Learn how to safely use, store and throw away your medicines at www.disposemymeds.org.          This list is accurate as of 11/6/18  6:47 PM.  Always use your most recent med list.                   Brand Name Dispense Instructions for use Diagnosis    IBUPROFEN PO      Take  by mouth.        lamoTRIgine 200 MG tablet    LaMICtal     100 mg 2 times daily        lisinopril-hydrochlorothiazide 20-12.5 MG per tablet    PRINZIDE/ZESTORETIC    90 tablet    Take 1 tablet by mouth daily    Essential hypertension, benign       OXcarbazepine 300 MG tablet    TRILEPTAL      150 mg 2 times daily

## 2018-11-06 NOTE — LETTER
11/6/2018         RE: Bill Acevedo  [address]  [place] [facility] [address]        Dear Colleague,    Thank you for referring your patient, Bill Acevedo, to the [facility] [place]. Please see a copy of my visit note below.    HPI   [facility]   Clinic Visit s Nov 6, 2018    PCP: Andrey Stinson      Bill is a 43 year old male who is seen as self referral for   Chief Complaint   Patient presents with     Left Knee - Pain       Injury: Patient describes injury as getting off a stool and landed on his knee.        Location of Pain: left knee deep anterior , nonradiating   Duration of Pain: 4 week(s)  Rating of Pain at worst: 8/10  Rating of Pain Currently: 6/10  Pain is better with: activity avoidance   Pain is worse with: walking, stairs, stretching the knee,  kneeling  Treatment so far consists of: brace, heat, ice and rest, ibuprofen   Associated symptoms:  Swelling, bruising and deformity  Recent imaging completed: No recent imaging completed.  Prior History of related problems: previous power , right knee issues, bilateral ankle issues    Very active laterally, on feet moving mother's apartment, lifting hauling and climbing stairs regularly making knee worse.     Social History: is employed as a/an desk work      Past Medical History:   Diagnosis Date     Depressive disorder     Bi-Polar, Medicated     Hypertension        Patient Active Problem List    Diagnosis Date Noted     Morbid obesity (H) 06/06/2017     Priority: Medium     Prediabetes 04/11/2013     Priority: Medium     Bipolar 2 disorder (H) 03/20/2013     Priority: Medium     Vitamin D deficiency 03/20/2013     Priority: Medium     Problem list name updated by automated process. Provider to review       Elevated fasting glucose 03/20/2013     Priority: Medium     Body mass index 50.0-59.9, adult (H) 11/17/2011     Priority: Medium     CARDIOVASCULAR SCREENING; LDL GOAL  "LESS THAN 160 10/31/2010     Priority: Medium     Ankle instability 05/17/2010     Priority: Medium     Ankle pain 05/17/2010     Priority: Medium     Essential hypertension, benign 04/29/2010     Priority: Medium       Family History   Problem Relation Age of Onset     Diabetes Maternal Grandmother      Allergies Mother      Allergies Sister      Allergies Maternal Grandmother      Arthritis Maternal Grandmother      Cancer Maternal Grandmother      heart attack       Social History     Social History     Marital status: Single     Spouse name: N/A     Number of children: N/A     Years of education: N/A     Social History Main Topics     Smoking status: Never Smoker     Smokeless tobacco: Never Used     Alcohol use 0.0 oz/week     0 Standard drinks or equivalent per week      Comment: occas       Past Surgical History:   Procedure Laterality Date     BIOPSY      Evaluating drug reaction to lamictal       Review of Systems   Musculoskeletal: Positive for joint pain.   All other systems reviewed and are negative.        Physical Exam   Musculoskeletal:        Right knee: Medial joint line tenderness noted.        Left knee: Medial joint line tenderness noted.     /74  Ht 5' 10\" (1.778 m)  Wt (!) 439 lb (199.1 kg)  BMI 62.99 kg/m2  Constitutional:well-developed, well-nourished, and in no distress. obese,  Cardiovascular: Intact distal pulses.    Neurological: alert. Gait Normal:   Gait, station, stance, and balance appear normal for age  Skin: Skin is warm and dry.   Psychiatric: Mood and affect normal.   Respiratory: unlabored, speaks in full sentences  Lymph: no LAD, no lymphangitis          Left Knee Exam   Swelling: Mild  Effusion: Yes    Tenderness   The patient is experiencing tenderness in the medial joint line.    Range of Motion   Extension: Normal  Flexion:     Normal    Tests   Drawer:       Anterior - Negative     Posterior - Negative  Varus:  Negative  Valgus: Negative  Patellar Apprehension: " No    Right Knee Exam   Swelling: Mild  Effusion: No    Tenderness   The patient is experiencing tenderness in the medial joint line.    Range of Motion   Extension: Normal  Flexion:     Normal    Tests   Drawer:       Anterior - Negative    Posterior - Negative  Varus:  Negative  Valgus: Negative  Patellar Apprehension: No              X-ray images Ordered and independently reviewed by me in the office today with the patient. X-ray shows:   Recent Results (from the past 48 hour(s))   XR Knee Standing AP Bilat [place] Bilat Lat Left    Narrative    11/6/2018    Mild medial joint space narrowing bilaterally, otherwise no significant   arthritis, no fractures, no soft tissue deformities.              ASSESSMENT/PLAN    ICD-10-CM    1. Acute pain of left knee M25.562 XR Knee Standing AP Bilat [place] Bilat Lat Left   2. Primary osteoarthritis of left knee M17.12    3. Primary osteoarthritis of right knee M17.11      Mild bilateral arthritis, slightly worse on the left related to recent direct blow.  Reassurance, otherwise stable knee.  Reviewed nature of degenerative arthritis, discussed options including joint protection strategies and symptom management, including NSAIDS,  acetaminophen on a scheduled and/or as needed basis, joint injection with cortisone or hyaluronic acid derivatives, bracing, glucosamine, maintenance of overall knee stability through strengthening through physical therapy.  Pt opts for  symptom treatment, activity modification/rest and injection therapy    Discussed lifestyles changes to reduce risks of arthritis progression:  Stop using tobacco if any current use.  Lose weight  Exercise safely.    Large Joint Injection/Arthocentesis  Date/Time: 11/6/2018 5:07 PM  Performed by: LEISA CASTORENA  Authorized by: LEISA CASTORENA     Indications:  Pain and osteoarthritis  Needle Size:  25 G  Guidance: landmark guided    Approach:  Superolateral  Location:  Knee  Laterality:   Bilateral  Site:  Bilateral knee  Medications (Right):  4 mL lidocaine 1 %; 40 mg methylPREDNISolone acetate 40 MG/ML  Medications (Left):  4 mL lidocaine 1 %; 40 mg methylPREDNISolone acetate 40 MG/ML  Outcome:  Tolerated well, no immediate complications  Procedure discussed: discussed risks, benefits, and alternatives    Consent Given by:  Patient  Timeout: timeout called immediately prior to procedure    Prep: patient was prepped and draped in usual sterile fashion     LOT [id number] exp 07/2020              Again, thank you for allowing me to participate in the care of your patient.        Sincerely,        Shin Gee MD

## 2018-11-06 NOTE — PROGRESS NOTES
[facility]   Clinic Visit s Nov 6, 2018    PCP: Andrey Stinson      Bill is a 43 year old male who is seen as self referral for   Chief Complaint   Patient presents with     Left Knee - Pain       Injury: Patient describes injury as getting off a stool and landed on his knee.        Location of Pain: left knee deep anterior , nonradiating   Duration of Pain: 4 week(s)  Rating of Pain at worst: 8/10  Rating of Pain Currently: 6/10  Pain is better with: activity avoidance   Pain is worse with: walking, stairs, stretching the knee,  kneeling  Treatment so far consists of: brace, heat, ice and rest, ibuprofen   Associated symptoms:  Swelling, bruising and deformity  Recent imaging completed: No recent imaging completed.  Prior History of related problems: previous power , right knee issues, bilateral ankle issues    Very active laterally, on feet moving mother's apartment, lifting hauling and climbing stairs regularly making knee worse.     Social History: is employed as a/an desk work      Past Medical History:   Diagnosis Date     Depressive disorder     Bi-Polar, Medicated     Hypertension        Patient Active Problem List    Diagnosis Date Noted     Morbid obesity (H) 06/06/2017     Priority: Medium     Prediabetes 04/11/2013     Priority: Medium     Bipolar 2 disorder (H) 03/20/2013     Priority: Medium     Vitamin D deficiency 03/20/2013     Priority: Medium     Problem list name updated by automated process. Provider to review       Elevated fasting glucose 03/20/2013     Priority: Medium     Body mass index 50.0-59.9, adult (H) 11/17/2011     Priority: Medium     CARDIOVASCULAR SCREENING; LDL GOAL LESS THAN 160 10/31/2010     Priority: Medium     Ankle instability 05/17/2010     Priority: Medium     Ankle pain 05/17/2010     Priority: Medium     Essential hypertension, benign 04/29/2010     Priority: Medium       Family History   Problem Relation Age of Onset     Diabetes  "Maternal Grandmother      Allergies Mother      Allergies Sister      Allergies Maternal Grandmother      Arthritis Maternal Grandmother      Cancer Maternal Grandmother      heart attack       Social History     Social History     Marital status: Single     Spouse name: N/A     Number of children: N/A     Years of education: N/A     Social History Main Topics     Smoking status: Never Smoker     Smokeless tobacco: Never Used     Alcohol use 0.0 oz/week     0 Standard drinks or equivalent per week      Comment: occas       Past Surgical History:   Procedure Laterality Date     BIOPSY      Evaluating drug reaction to lamictal       Review of Systems   Musculoskeletal: Positive for joint pain.   All other systems reviewed and are negative.        Physical Exam   Musculoskeletal:        Right knee: Medial joint line tenderness noted.        Left knee: Medial joint line tenderness noted.     /74  Ht 5' 10\" (1.778 m)  Wt (!) 439 lb (199.1 kg)  BMI 62.99 kg/m2  Constitutional:well-developed, well-nourished, and in no distress. obese,  Cardiovascular: Intact distal pulses.    Neurological: alert. Gait Normal:   Gait, station, stance, and balance appear normal for age  Skin: Skin is warm and dry.   Psychiatric: Mood and affect normal.   Respiratory: unlabored, speaks in full sentences  Lymph: no LAD, no lymphangitis          Left Knee Exam   Swelling: Mild  Effusion: Yes    Tenderness   The patient is experiencing tenderness in the medial joint line.    Range of Motion   Extension: Normal  Flexion:     Normal    Tests   Drawer:       Anterior - Negative     Posterior - Negative  Varus:  Negative  Valgus: Negative  Patellar Apprehension: No    Right Knee Exam   Swelling: Mild  Effusion: No    Tenderness   The patient is experiencing tenderness in the medial joint line.    Range of Motion   Extension: Normal  Flexion:     Normal    Tests   Drawer:       Anterior - Negative    Posterior - Negative  Varus:  " Negative  Valgus: Negative  Patellar Apprehension: No              X-ray images Ordered and independently reviewed by me in the office today with the patient. X-ray shows:   Recent Results (from the past 48 hour(s))   XR Knee Standing AP Bilat [place] Bilat Lat Left    Narrative    11/6/2018    Mild medial joint space narrowing bilaterally, otherwise no significant   arthritis, no fractures, no soft tissue deformities.              ASSESSMENT/PLAN    ICD-10-CM    1. Acute pain of left knee M25.562 XR Knee Standing AP Bilat [place] Bilat Lat Left   2. Primary osteoarthritis of left knee M17.12    3. Primary osteoarthritis of right knee M17.11      Mild bilateral arthritis, slightly worse on the left related to recent direct blow.  Reassurance, otherwise stable knee.  Reviewed nature of degenerative arthritis, discussed options including joint protection strategies and symptom management, including NSAIDS,  acetaminophen on a scheduled and/or as needed basis, joint injection with cortisone or hyaluronic acid derivatives, bracing, glucosamine, maintenance of overall knee stability through strengthening through physical therapy.  Pt opts for  symptom treatment, activity modification/rest and injection therapy    Discussed lifestyles changes to reduce risks of arthritis progression:  Stop using tobacco if any current use.  Lose weight  Exercise safely.    Large Joint Injection/Arthocentesis  Date/Time: 11/6/2018 5:07 PM  Performed by: LEISA CASTORENA  Authorized by: LEISA CASTORENA     Indications:  Pain and osteoarthritis  Needle Size:  25 G  Guidance: landmark guided    Approach:  Superolateral  Location:  Knee  Laterality:  Bilateral  Site:  Bilateral knee  Medications (Right):  4 mL lidocaine 1 %; 40 mg methylPREDNISolone acetate 40 MG/ML  Medications (Left):  4 mL lidocaine 1 %; 40 mg methylPREDNISolone acetate 40 MG/ML  Outcome:  Tolerated well, no immediate complications  Procedure discussed:  discussed risks, benefits, and alternatives    Consent Given by:  Patient  Timeout: timeout called immediately prior to procedure    Prep: patient was prepped and draped in usual sterile fashion     LOT [id number] exp 07/2020

## 2019-01-24 NOTE — PROGRESS NOTES
"[facility]   Clinic Visit s Jan 25, 2019    PCP: Andrey Stinson      Bill is a 43 year old male who is seen as self referral for   Chief Complaint   Patient presents with     Lower Back - Pain       Injury: Patient describes injury as a fall. States he has fallen 3 times in two weeks due to slipping on the ice.      Fell 1/6, 1/10, 1/20 - fell forward on last one, pain worse after last fall.   Location of Pain: left low back, radiating to thigh   Duration of Pain: 1 week(s)  Rating of Pain at worst: 9/10  Rating of Pain Currently: 3/10  Pain is better with: sitting, standing, movement  Pain is worse with: self care and sitting to standing and twisting  Treatment so far consists of: lidocaine patch, vicoden (from headache \"stash\"), activity avoidance, home exercises and ibuprofen 600mg 4 x daily  Associated symptoms: no distal numbness or tingling; denies swelling or warmth  Recent imaging completed: No recent imaging completed.  Prior History of related problems: has had back pain in the past, but never this intense, usually resolves in 48 hours    Does some home exercises.       Sleep: interrupted, pain with movement, wakes up    Social History: is employed as a/an       Past Medical History:   Diagnosis Date     Depressive disorder     Bi-Polar, Medicated     Hypertension        Patient Active Problem List    Diagnosis Date Noted     Morbid obesity (H) 06/06/2017     Priority: Medium     Prediabetes 04/11/2013     Priority: Medium     Bipolar 2 disorder (H) 03/20/2013     Priority: Medium     Vitamin D deficiency 03/20/2013     Priority: Medium     Problem list name updated by automated process. Provider to review       Elevated fasting glucose 03/20/2013     Priority: Medium     Body mass index 50.0-59.9, adult (H) 11/17/2011     Priority: Medium     CARDIOVASCULAR SCREENING; LDL GOAL LESS THAN 160 10/31/2010     Priority: Medium     Ankle instability 05/17/2010     " "Priority: Medium     Ankle pain 05/17/2010     Priority: Medium     Essential hypertension, benign 04/29/2010     Priority: Medium       Family History   Problem Relation Age of Onset     Diabetes Maternal Grandmother      Allergies Mother      Allergies Sister      Allergies Maternal Grandmother      Arthritis Maternal Grandmother      Cancer Maternal Grandmother         heart attack       Social History     Socioeconomic History     Marital status: Single                                                           Tobacco Use     Smoking status: Never Smoker     Smokeless tobacco: Never Used   Substance and Sexual Activity     Alcohol use: Yes     Alcohol/week: 0.0 oz       Past Surgical History:   Procedure Laterality Date     BIOPSY      Evaluating drug reaction to lamictal       Review of Systems   Musculoskeletal: Positive for back pain and myalgias.   Neurological: Negative for tingling, sensory change and focal weakness.   All other systems reviewed and are negative.        Physical Exam  /74   Ht 1.778 m (5' 10\")   Wt (!) 199.1 kg (439 lb)   BMI 62.99 kg/m    Constitutional:well-developed, well-nourished, and in no distress.   Cardiovascular: Intact distal pulses.    Neurological: alert. Gait Normal:   Gait, station, stance, and balance appear normal for age  Skin: Skin is warm and dry.   Psychiatric: Mood and affect normal.   Respiratory: unlabored, speaks in full sentences  Lymph: no LAD, no lymphangitis      Back Exam     Tenderness   The patient is experiencing tenderness in the lumbar (midline, lower lumbar).    Range of Motion   Extension: abnormal Back extension: full extension, pain at end range.   Flexion: 30   Lateral bend right: normal   Lateral bend left: normal   Rotation right: normal   Rotation left: normal     Muscle Strength   The patient has normal back strength.    Tests   Straight leg raise right: negative  Straight leg raise left: positiveLeft straight leg raise test: for left " buttock pain.    Reflexes   Patellar: normal  Achilles: normal    Other   Gait: normal   Erythema: no back redness  Scars: absent          ASSESSMENT/PLAN    ICD-10-CM    1. Acute left-sided low back pain without sciatica M54.5 XR Lumbar Spine 2/3 Views     methylPREDNISolone (MEDROL DOSEPAK) 4 MG tablet therapy pack   2. Lumbar paraspinal muscle spasm M62.830 cyclobenzaprine (FLEXERIL) 5 MG tablet     Multiple falls over the past month with midline tenderness, x-rays would be warranted.  X-ray capacity not available in the clinic today, patient referred to [facility] for imaging, will call with results.    Patient reports limited time dealing with ill mother.  Declines physical therapy at this time.  Okay for short course of oral steroids and muscle relaxers for spasms limiting sleep.  If not improved in 2 weeks would recommend physical therapy.  Patient comfortable with plan.    X-ray report not available but images reviewed independently by me today and is a demonstrates some mild anterior vertebral body spurring but no acute fractures.  Patient notified.

## 2019-01-25 ENCOUNTER — TRANSFERRED RECORDS (OUTPATIENT)
Dept: HEALTH INFORMATION MANAGEMENT | Facility: CLINIC | Age: 44
End: 2019-01-25

## 2019-01-25 ENCOUNTER — MYC MEDICAL ADVICE (OUTPATIENT)
Dept: ORTHOPEDICS | Facility: CLINIC | Age: 44
End: 2019-01-25

## 2019-01-25 ENCOUNTER — OFFICE VISIT (OUTPATIENT)
Dept: ORTHOPEDICS | Facility: CLINIC | Age: 44
End: 2019-01-25
Payer: COMMERCIAL

## 2019-01-25 VITALS
HEIGHT: 70 IN | SYSTOLIC BLOOD PRESSURE: 128 MMHG | BODY MASS INDEX: 45.1 KG/M2 | WEIGHT: 315 LBS | DIASTOLIC BLOOD PRESSURE: 74 MMHG

## 2019-01-25 DIAGNOSIS — M62.830 LUMBAR PARASPINAL MUSCLE SPASM: ICD-10-CM

## 2019-01-25 DIAGNOSIS — M54.50 ACUTE LEFT-SIDED LOW BACK PAIN WITHOUT SCIATICA: Primary | ICD-10-CM

## 2019-01-25 PROCEDURE — 99204 OFFICE O/P NEW MOD 45 MIN: CPT | Performed by: FAMILY MEDICINE

## 2019-01-25 RX ORDER — CYCLOBENZAPRINE HCL 5 MG
TABLET ORAL
Qty: 30 TABLET | Refills: 0 | Status: SHIPPED | OUTPATIENT
Start: 2019-01-25 | End: 2022-08-18

## 2019-01-25 RX ORDER — METHYLPREDNISOLONE 4 MG
TABLET, DOSE PACK ORAL
Qty: 21 TABLET | Refills: 0 | Status: SHIPPED | OUTPATIENT
Start: 2019-01-25 | End: 2019-06-18

## 2019-01-25 ASSESSMENT — ENCOUNTER SYMPTOMS
SENSORY CHANGE: 0
TINGLING: 0
BACK PAIN: 1
MYALGIAS: 1
FOCAL WEAKNESS: 0

## 2019-01-25 ASSESSMENT — MIFFLIN-ST. JEOR: SCORE: 2902.54

## 2019-01-25 NOTE — LETTER
"    1/25/2019         RE: Bill Acevedo  [id number]  [place] [address]        Dear Colleague,    Thank you for referring your patient, Bill Acevedo, to the [facility] [place]. Please see a copy of my visit note below.    HPI     [facility]   Clinic Visit s Jan 25, 2019    PCP: Andrey Stinson      Bill is a 43 year old male who is seen as self referral for   Chief Complaint   Patient presents with     Lower Back - Pain       Injury: Patient describes injury as a fall. States he has fallen 3 times in two weeks due to slipping on the ice.      Fell 1/6, 1/10, 1/20 - fell forward on last one, pain worse after last fall.   Location of Pain: left low back, radiating to thigh   Duration of Pain: 1 week(s)  Rating of Pain at worst: 9/10  Rating of Pain Currently: 3/10  Pain is better with: sitting, standing, movement  Pain is worse with: self care and sitting to standing and twisting  Treatment so far consists of: lidocaine patch, vicoden (from headache \"stash\"), activity avoidance, home exercises and ibuprofen 600mg 4 x daily  Associated symptoms: no distal numbness or tingling; denies swelling or warmth  Recent imaging completed: No recent imaging completed.  Prior History of related problems: has had back pain in the past, but never this intense, usually resolves in 48 hours    Does some home exercises.       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 Provider to review       Elevated fasting glucose 03/20/2013     Priority: Medium     Body mass index 50.0-59.9, adult (H) 11/17/2011     Priority: Medium     CARDIOVASCULAR SCREENING; LDL GOAL LESS THAN 160 10/31/2010     Priority: Medium     Ankle instability 05/17/2010     Priority: Medium     Ankle pain 05/17/2010     Priority: Medium     Essential hypertension, benign 04/29/2010     Priority: Medium       Family History   Problem Relation Age of Onset     Diabetes Maternal Grandmother      Allergies Mother      Allergies Sister      Allergies Maternal Grandmother      Arthritis Maternal Grandmother      Cancer Maternal Grandmother         heart attack       Social History     Socioeconomic History     Marital status: Single                                                           Tobacco Use     Smoking status: Never Smoker     Smokeless tobacco: Never Used   Substance and Sexual Activity     Alcohol use: Yes     Alcohol/week: 0.0 oz       Past Surgical History:   Procedure Laterality Date     BIOPSY      Evaluating drug reaction to lamictal       Review of Systems   Musculoskeletal: Positive for back pain and myalgias.   Neurological: Negative for tingling, sensory change and focal weakness.   All other systems reviewed and are negative.        Physical Exam  /74   Ht 1.778 m (5' 10\")   Wt (!) 199.1 kg (439 lb)   BMI 62.99 kg/m     Constitutional:well-developed, well-nourished, and in no distress.   Cardiovascular: Intact distal pulses.    Neurological: alert. Gait Normal:   Gait, station, stance, and balance appear normal for age  Skin: Skin is warm and dry.   Psychiatric: Mood and affect normal.   Respiratory: unlabored, speaks in full sentences  Lymph: no LAD, no lymphangitis      Back Exam     Tenderness   The patient is experiencing tenderness in the lumbar (midline, lower lumbar).    Range of Motion   Extension: abnormal Back extension: full extension, pain at end range.   Flexion: 30 "   Lateral bend right: normal   Lateral bend left: normal   Rotation right: normal   Rotation left: normal     Muscle Strength   The patient has normal back strength.    Tests   Straight leg raise right: negative  Straight leg raise left: positiveLeft straight leg raise test: for left buttock pain.    Reflexes   Patellar: normal  Achilles: normal    Other   Gait: normal   Erythema: no back redness  Scars: absent          ASSESSMENT/PLAN    ICD-10-CM    1. Acute left-sided low back pain without sciatica M54.5 XR Lumbar Spine 2/3 Views     methylPREDNISolone (MEDROL DOSEPAK) 4 MG tablet therapy pack   2. Lumbar paraspinal muscle spasm M62.830 cyclobenzaprine (FLEXERIL) 5 MG tablet     Multiple falls over the past month with midline tenderness, x-rays would be warranted.  X-ray capacity not available in the clinic today, patient referred to [facility] for imaging, will call with results.    Patient reports limited time dealing with ill mother.  Declines physical therapy at this time.  Okay for short course of oral steroids and muscle relaxers for spasms limiting sleep.  If not improved in 2 weeks would recommend physical therapy.  Patient comfortable with plan.    X-ray report not available but images reviewed independently by me today and is a demonstrates some mild anterior vertebral body spurring but no acute fractures.  Patient notified.    Again, thank you for allowing me to participate in the care of your patient.        Sincerely,        Shin Gee MD

## 2019-01-25 NOTE — TELEPHONE ENCOUNTER
Patient was concerned of effects of flexeril on the body and stopping his breathing.     Per GM, that is a precaution to be aware of, but if sleep apnea is not a problem for this patient, the issue of breathing should not be of concern. Also advised that pharmacy may be able to answer further questions about medications.     We also agreed that the patient would see how the two medications treat his back and he will return for an OV if needed to further evaluate his chronic left leg numbness; not related to most recent fall.

## 2019-06-18 ENCOUNTER — NURSE TRIAGE (OUTPATIENT)
Dept: FAMILY MEDICINE | Facility: CLINIC | Age: 44
End: 2019-06-18

## 2019-06-18 ENCOUNTER — ANCILLARY PROCEDURE (OUTPATIENT)
Dept: GENERAL RADIOLOGY | Facility: CLINIC | Age: 44
End: 2019-06-18
Attending: FAMILY MEDICINE
Payer: COMMERCIAL

## 2019-06-18 ENCOUNTER — OFFICE VISIT (OUTPATIENT)
Dept: FAMILY MEDICINE | Facility: CLINIC | Age: 44
End: 2019-06-18
Payer: COMMERCIAL

## 2019-06-18 VITALS
TEMPERATURE: 98.7 F | WEIGHT: 315 LBS | DIASTOLIC BLOOD PRESSURE: 84 MMHG | OXYGEN SATURATION: 96 % | HEART RATE: 104 BPM | SYSTOLIC BLOOD PRESSURE: 132 MMHG | HEIGHT: 70 IN | BODY MASS INDEX: 45.1 KG/M2 | RESPIRATION RATE: 20 BRPM

## 2019-06-18 DIAGNOSIS — R07.89 CHEST TIGHTNESS: ICD-10-CM

## 2019-06-18 DIAGNOSIS — R73.03 PREDIABETES: ICD-10-CM

## 2019-06-18 DIAGNOSIS — J20.9 ACUTE BRONCHITIS WITH SYMPTOMS > 10 DAYS: ICD-10-CM

## 2019-06-18 DIAGNOSIS — I10 ESSENTIAL HYPERTENSION, BENIGN: ICD-10-CM

## 2019-06-18 DIAGNOSIS — E66.01 MORBID OBESITY (H): ICD-10-CM

## 2019-06-18 DIAGNOSIS — R07.89 CHEST TIGHTNESS: Primary | ICD-10-CM

## 2019-06-18 LAB
D DIMER PPP FEU-MCNC: 0.4 UG/ML FEU (ref 0–0.5)
ERYTHROCYTE [DISTWIDTH] IN BLOOD BY AUTOMATED COUNT: 13.3 % (ref 10–15)
ERYTHROCYTE [SEDIMENTATION RATE] IN BLOOD BY WESTERGREN METHOD: 14 MM/H (ref 0–15)
HCT VFR BLD AUTO: 45.7 % (ref 40–53)
HGB BLD-MCNC: 15.6 G/DL (ref 13.3–17.7)
MCH RBC QN AUTO: 29.4 PG (ref 26.5–33)
MCHC RBC AUTO-ENTMCNC: 34.1 G/DL (ref 31.5–36.5)
MCV RBC AUTO: 86 FL (ref 78–100)
PLATELET # BLD AUTO: 342 10E9/L (ref 150–450)
RBC # BLD AUTO: 5.3 10E12/L (ref 4.4–5.9)
WBC # BLD AUTO: 12.2 10E9/L (ref 4–11)

## 2019-06-18 PROCEDURE — 84484 ASSAY OF TROPONIN QUANT: CPT | Performed by: FAMILY MEDICINE

## 2019-06-18 PROCEDURE — 71046 X-RAY EXAM CHEST 2 VIEWS: CPT | Mod: FY

## 2019-06-18 PROCEDURE — 85652 RBC SED RATE AUTOMATED: CPT | Performed by: FAMILY MEDICINE

## 2019-06-18 PROCEDURE — 85379 FIBRIN DEGRADATION QUANT: CPT | Performed by: FAMILY MEDICINE

## 2019-06-18 PROCEDURE — 85027 COMPLETE CBC AUTOMATED: CPT | Performed by: FAMILY MEDICINE

## 2019-06-18 PROCEDURE — 93000 ELECTROCARDIOGRAM COMPLETE: CPT | Performed by: FAMILY MEDICINE

## 2019-06-18 PROCEDURE — 99214 OFFICE O/P EST MOD 30 MIN: CPT | Performed by: FAMILY MEDICINE

## 2019-06-18 PROCEDURE — 36415 COLL VENOUS BLD VENIPUNCTURE: CPT | Performed by: FAMILY MEDICINE

## 2019-06-18 ASSESSMENT — ANXIETY QUESTIONNAIRES
5. BEING SO RESTLESS THAT IT IS HARD TO SIT STILL: SEVERAL DAYS
1. FEELING NERVOUS, ANXIOUS, OR ON EDGE: SEVERAL DAYS
GAD7 TOTAL SCORE: 6
IF YOU CHECKED OFF ANY PROBLEMS ON THIS QUESTIONNAIRE, HOW DIFFICULT HAVE THESE PROBLEMS MADE IT FOR YOU TO DO YOUR WORK, TAKE CARE OF THINGS AT HOME, OR GET ALONG WITH OTHER PEOPLE: NOT DIFFICULT AT ALL
2. NOT BEING ABLE TO STOP OR CONTROL WORRYING: NOT AT ALL
7. FEELING AFRAID AS IF SOMETHING AWFUL MIGHT HAPPEN: NOT AT ALL
3. WORRYING TOO MUCH ABOUT DIFFERENT THINGS: NOT AT ALL
6. BECOMING EASILY ANNOYED OR IRRITABLE: MORE THAN HALF THE DAYS

## 2019-06-18 ASSESSMENT — PATIENT HEALTH QUESTIONNAIRE - PHQ9
5. POOR APPETITE OR OVEREATING: MORE THAN HALF THE DAYS
SUM OF ALL RESPONSES TO PHQ QUESTIONS 1-9: 8

## 2019-06-18 ASSESSMENT — MIFFLIN-ST. JEOR: SCORE: 2956.51

## 2019-06-18 NOTE — PROGRESS NOTES
Subjective     Bill Acevedo is a 44 year old male who presents to clinic today for the following health issues:    HPI   Chest Pain      Onset: about 2 weeks     Description (location/character/radiation/duration): chest tightness, productive cough, palpitations      Intensity:  moderate    Accompanying signs and symptoms:        Shortness of breath: YES       Sweating: YES       Nausea/vomitting: YES       Palpitations: YES       Other (fevers/chills/cough/heartburn/lightheadedness): YES- cough, swelling in legs     History (similar episodes/previous evaluation): had pneumonia about 5 years with similar symptoms     Precipitating or alleviating factors:       Worse with exertion: YES       Worse with breathing: no        Related to eating: no        Better with burping: no     Therapies tried and outcome: cough drops     Patient Active Problem List   Diagnosis     Essential hypertension, benign     Ankle instability     Ankle pain     CARDIOVASCULAR SCREENING; LDL GOAL LESS THAN 160     Body mass index 50.0-59.9, adult (H)     Bipolar 2 disorder (H)     Vitamin D deficiency     Elevated fasting glucose     Prediabetes     Morbid obesity (H)     Past Surgical History:   Procedure Laterality Date     BIOPSY      Evaluating drug reaction to lamictal       Social History     Tobacco Use     Smoking status: Never Smoker     Smokeless tobacco: Never Used   Substance Use Topics     Alcohol use: Yes     Alcohol/week: 0.0 oz     Comment: occas     Family History   Problem Relation Age of Onset     Diabetes Maternal Grandmother      Allergies Mother      Allergies Sister      Allergies Maternal Grandmother      Arthritis Maternal Grandmother      Cancer Maternal Grandmother         heart attack         Current Outpatient Medications   Medication Sig Dispense Refill     IBUPROFEN PO Take  by mouth.       lisinopril-hydrochlorothiazide (PRINZIDE/ZESTORETIC) 20-12.5 MG per tablet Take 1 tablet by mouth daily 90 tablet 3  "    cyclobenzaprine (FLEXERIL) 5 MG tablet Take 1-2 tabs at night as needed for lumbar spasms affecting sleep (Patient not taking: Reported on 6/18/2019) 30 tablet 0     lamoTRIgine (LAMICTAL) 200 MG tablet 100 mg 2 times daily  5     OXcarbazepine (TRILEPTAL) 300 MG tablet 150 mg 2 times daily  4     No Known Allergies  Recent Labs   Lab Test 07/11/18  1701 06/06/17  1551 12/16/15  0921  03/12/14  1637 04/10/13  1644   A1C  --   --   --   --   --  6.1*   * 88  --   --   --   --    HDL 40 36*  --   --   --   --    TRIG 174* 196*  --   --   --   --    ALT 69 66 52  --   --   --    CR 1.03 0.96 0.95   < > 1.17 1.03   GFRESTIMATED 79 86 89   < > 71 82   GFRESTBLACK >90 >90   GFR Calc   >90   GFR Calc     < > 85 >90   POTASSIUM 3.9 3.8 3.8   < > 4.0 4.2   TSH  --   --   --   --  1.44  --     < > = values in this interval not displayed.      BP Readings from Last 3 Encounters:   06/18/19 132/84   01/25/19 128/74   11/06/18 128/74    Wt Readings from Last 3 Encounters:   06/18/19 (!) 205 kg (452 lb)   01/25/19 (!) 199.1 kg (439 lb)   11/06/18 (!) 199.1 kg (439 lb)            Reviewed and updated as needed this visit by Provider         Review of Systems   ROS COMP: Constitutional, HEENT, cardiovascular, pulmonary, gi and gu systems are negative, except as otherwise noted.      Objective    /84 (Cuff Size: Adult Large)   Pulse 104   Temp 98.7  F (37.1  C) (Tympanic)   Resp 20   Ht 1.778 m (5' 10\")   Wt (!) 205 kg (452 lb)   SpO2 96%   BMI 64.86 kg/m    Body mass index is 64.86 kg/m .  Physical Exam   GENERAL: healthy, alert and no distress  EYES: Eyes grossly normal to inspection, PERRL and conjunctivae and sclerae normal  NECK: no adenopathy, no asymmetry, masses, or scars and thyroid normal to palpation  RESP: expiratory wheezes throughout  CV: regular rate and rhythm, normal S1 S2, no S3 or S4, no murmur, click or rub, no peripheral edema and peripheral pulses " "strong  ABDOMEN: soft, nontender, no hepatosplenomegaly, no masses and bowel sounds normal  MS: no gross musculoskeletal defects noted, no edema  SKIN: no suspicious lesions or rashes  NEURO: Normal strength and tone, mentation intact and speech normal            Assessment & Plan     1. Chest tightness  Has been for last 2 weeks with increased SOB,   ekg is normal, CXR showed no abnormal finding   - EKG 12-lead complete w/read - Clinics  - XR Chest 2 Views; Future  - CBC with platelets  - ESR: Erythrocyte sedimentation rate  - D dimer, quantitative  - Troponin I    2. Morbid obesity (H)  Has increased risk of blood clots and CAD, EKG is normal, the lab results will be reviewed and update pt   - EKG 12-lead complete w/read - Clinics  - XR Chest 2 Views; Future  - CBC with platelets  - ESR: Erythrocyte sedimentation rate  - D dimer, quantitative  - Troponin I    3. Prediabetes  Has been fluctuating with current sx, will have him to check the lab for further evaluation   - EKG 12-lead complete w/read - Clinics  - XR Chest 2 Views; Future  - CBC with platelets  - ESR: Erythrocyte sedimentation rate  - D dimer, quantitative  - Troponin I    4. Essential hypertension, benign  Stable   - EKG 12-lead complete w/read - Clinics  - XR Chest 2 Views; Future  - CBC with platelets  - ESR: Erythrocyte sedimentation rate  - D dimer, quantitative  - Troponin I     5. Acute bronchitis with symptoms > 10 days  Comment: has increased SOB and cough, will have him to try abx  Plan: amoxicillin-clavulanate (AUGMENTIN) 875-125 MG         tablet              BMI:   Estimated body mass index is 64.86 kg/m  as calculated from the following:    Height as of this encounter: 1.778 m (5' 10\").    Weight as of this encounter: 205 kg (452 lb).   Weight management plan: Discussed healthy diet and exercise guidelines        FUTURE APPOINTMENTS:       - Follow-up visit in 3 months for CPE    No follow-ups on file.    Demarcus Dunn MD  [facility] " [place]

## 2019-06-18 NOTE — TELEPHONE ENCOUNTER
Acute Illness   Acute illness concerns: cough  think his mother has given him Pneumonia         Additional Information    Negative: Previous asthma attacks and this feels like asthma attack    Negative: Chest pain present when not coughing    Negative: Difficulty breathing    Negative: Passed out (i.e., fainted, collapsed and was not responding)    Negative: Patient sounds very sick or weak to the triager    Negative: Coughed up > 1 tablespoon (15 ml) blood (Exception: blood-tinged sputum)    Negative: Fever > 103 F (39.4 C)    Negative: Fever > 101 F (38.3 C) and over 60 years of age    Negative: Fever > 100.0 F (37.8 C) and has diabetes mellitus or a weak immune system (e.g., HIV positive, cancer chemotherapy, organ transplant, splenectomy, chronic steroids)    Negative: Fever > 100.0 F (37.8 C) and bedridden (e.g., nursing home patient, stroke, chronic illness, recovering from surgery)    Negative: Increasing ankle swelling    Negative: Wheezing is present    Negative: SEVERE coughing spells (e.g., whooping sound after coughing, vomiting after coughing)    Negative: Coughing up kaylin-colored (reddish-brown) or blood-tinged sputum    Negative: Fever present > 3 days (72 hours)    Negative: Fever returns after gone for over 24 hours and symptoms worse or not improved    Negative: Using nasal washes and pain medicine > 24 hours and sinus pain persists    Negative: Known COPD or other severe lung disease (i.e., bronchiectasis, cystic fibrosis, lung surgery) and worsening symptoms (i.e., increased sputum purulence or amount, increased breathing difficulty)    Negative: Continuous (nonstop) coughing interferes with work or school and no improvement using cough treatment per Care Advice    Patient wants to be seen    Negative: Bluish (or gray) lips or face    Negative: Severe difficulty breathing (e.g., struggling for each breath, speaks in single words)    Negative: Rapid onset of cough and has hives    Negative:  "Coughing started suddenly after medicine, an allergic food or bee sting    Negative: Difficulty breathing after exposure to flames, smoke, or fumes    Negative: Sounds like a life-threatening emergency to the triager    Answer Assessment - Initial Assessment Questions  1. ONSET: \"When did the cough begin?\"      1-2 weeks- mother has pneumonia  2. SEVERITY: \"How bad is the cough today?\"       Worse today that previus  3. RESPIRATORY DISTRESS: \"Describe your breathing.\"       Feels like he cannot get a full breath-   4. FEVER: \"Do you have a fever?\" If so, ask: \"What is your temperature, how was it measured, and when did it start?\"      Feels warm- did not take temp  5. HEMOPTYSIS: \"Are you coughing up any blood?\" If so ask: \"How much?\" (flecks, streaks, tablespoons, etc.)      none  6. TREATMENT: \"What have you done so far to treat the cough?\" (e.g., meds, fluids, humidifier)      Steam shower- cough drops  7. CARDIAC HISTORY: \"Do you have any history of heart disease?\" (e.g., heart attack, congestive heart failure)       none  8. LUNG HISTORY: \"Do you have any history of lung disease?\"  (e.g., pulmonary embolus, asthma, emphysema)      drea  9. PE RISK FACTORS: \"Do you have a history of blood clots?\" (or: recent major surgery, recent prolonged travel, bedridden )      none  10. OTHER SYMPTOMS: \"Do you have any other symptoms? (e.g., runny nose, wheezing, chest pain)        Edema lower legs- not new- runny nose  11. PREGNANCY: \"Is there any chance you are pregnant?\" \"When was your last menstrual period?\"        Na    12. TRAVEL: \"Have you traveled out of the country in the last month?\" (e.g., travel history, exposures)        none    Protocols used: COUGH-A-OH      Shawnee Hernandez RN BSN  [facility]  [phone]      "

## 2019-06-19 LAB — TROPONIN I SERPL-MCNC: <0.015 UG/L (ref 0–0.04)

## 2019-06-19 ASSESSMENT — ANXIETY QUESTIONNAIRES: GAD7 TOTAL SCORE: 6

## 2019-07-31 ENCOUNTER — OFFICE VISIT (OUTPATIENT)
Dept: FAMILY MEDICINE | Facility: CLINIC | Age: 44
End: 2019-07-31
Payer: COMMERCIAL

## 2019-07-31 VITALS
HEART RATE: 99 BPM | BODY MASS INDEX: 64.42 KG/M2 | SYSTOLIC BLOOD PRESSURE: 120 MMHG | OXYGEN SATURATION: 96 % | TEMPERATURE: 99 F | WEIGHT: 315 LBS | DIASTOLIC BLOOD PRESSURE: 72 MMHG

## 2019-07-31 DIAGNOSIS — J01.80 OTHER ACUTE SINUSITIS, RECURRENCE NOT SPECIFIED: Primary | ICD-10-CM

## 2019-07-31 DIAGNOSIS — J02.9 SORE THROAT: ICD-10-CM

## 2019-07-31 LAB
DEPRECATED S PYO AG THROAT QL EIA: NORMAL
SPECIMEN SOURCE: NORMAL

## 2019-07-31 PROCEDURE — 87081 CULTURE SCREEN ONLY: CPT | Performed by: FAMILY MEDICINE

## 2019-07-31 PROCEDURE — 99213 OFFICE O/P EST LOW 20 MIN: CPT | Performed by: FAMILY MEDICINE

## 2019-07-31 PROCEDURE — 87880 STREP A ASSAY W/OPTIC: CPT | Performed by: FAMILY MEDICINE

## 2019-07-31 RX ORDER — AZITHROMYCIN 250 MG/1
TABLET, FILM COATED ORAL
Qty: 6 TABLET | Refills: 0 | Status: SHIPPED | OUTPATIENT
Start: 2019-07-31 | End: 2019-08-29

## 2019-07-31 NOTE — PROGRESS NOTES
Subjective     Bill Acevedo is a 44 year old male who presents to clinic today for the following health issues:    HPI   Acute Illness   Acute illness concerns: sore throat  Onset: 2 days     Fever: no    Chills/Sweats: no    Headache (location?): no    Sinus Pressure: yes     Conjunctivitis:  no    Ear Pain: no    Rhinorrhea: YES, looks thick white but coughing up yellow     Congestion: YES    Sore Throat: YES - keeping him up     Cough: no    Wheeze: no    Decreased Appetite: YES    Nausea: no    Vomiting: no    Diarrhea:  YES    Dysuria/Freq.: no    Fatigue/Achiness: YES- fatigue    Sick/Strep Exposure: No - but was in nursing home for three days over weekend  with mother who passed away      Therapies Tried and outcome: ibuprofen for pain           Patient Active Problem List   Diagnosis     Essential hypertension, benign     Ankle instability     Ankle pain     CARDIOVASCULAR SCREENING; LDL GOAL LESS THAN 160     Body mass index 50.0-59.9, adult (H)     Bipolar 2 disorder (H)     Vitamin D deficiency     Elevated fasting glucose     Prediabetes     Morbid obesity (H)     Past Surgical History:   Procedure Laterality Date     BIOPSY      Evaluating drug reaction to lamictal       Social History     Tobacco Use     Smoking status: Never Smoker     Smokeless tobacco: Never Used   Substance Use Topics     Alcohol use: Yes     Alcohol/week: 0.0 oz     Comment: occas     Family History   Problem Relation Age of Onset     Allergies Mother      Diabetes Maternal Grandmother      Allergies Maternal Grandmother      Arthritis Maternal Grandmother      Cancer Maternal Grandmother         heart attack     Allergies Sister            Reviewed and updated as needed this visit by Provider         Review of Systems   ROS COMP: Constitutional, HEENT, cardiovascular, pulmonary, GI, , musculoskeletal, neuro, skin, endocrine and psych systems are negative, except as otherwise noted.      Objective    There were no vitals  "taken for this visit.  There is no height or weight on file to calculate BMI.  Physical Exam   GENERAL: healthy, alert and no distress  EYES: Eyes grossly normal to inspection,  and conjunctivae and sclerae normal  HENT: ear canals and TM's normal and oral mucous membranes moist,Throat with mild pharyngeal erythema, +ve  sinus  tenderness    NECK: no adenopathy, no asymmetry,   RESP: lungs clear to auscultation - no rales, rhonchi or wheezes  CV: regular rate and rhythm, normal S1 S2, no S3 or S4,       Diagnostic Test Results:  Labs reviewed in Epic  Strep screen - Negative        Assessment & Plan        (J02.9) Sore throat  (primary encounter diagnosis)  Comment:   Plan: Strep, Rapid Screen, Beta strep group A culture          Rapid strep negative, strep culture pending. Call  only if positive.    (J01.80) Other acute sinusitis, recurrence not specified  Comment:   Plan: azithromycin (ZITHROMAX) 250 MG tablet                 URI lingering onto sinusitis. Treat with Z pack.  Cares and symptomatic treatment discussed. Follow up if problem.       BMI:   Estimated body mass index is 64.42 kg/m  as calculated from the following:    Height as of 6/18/19: 1.778 m (5' 10\").    Weight as of this encounter: 203.7 kg (449 lb).         Karis Hill MD  [facility] [place]    "

## 2019-08-01 LAB
BACTERIA SPEC CULT: NORMAL
SPECIMEN SOURCE: NORMAL

## 2019-08-13 ENCOUNTER — OFFICE VISIT (OUTPATIENT)
Dept: FAMILY MEDICINE | Facility: CLINIC | Age: 44
End: 2019-08-13
Payer: COMMERCIAL

## 2019-08-13 VITALS
OXYGEN SATURATION: 97 % | HEIGHT: 70 IN | HEART RATE: 82 BPM | SYSTOLIC BLOOD PRESSURE: 138 MMHG | BODY MASS INDEX: 45.1 KG/M2 | WEIGHT: 315 LBS | TEMPERATURE: 98.3 F | DIASTOLIC BLOOD PRESSURE: 80 MMHG

## 2019-08-13 DIAGNOSIS — R07.0 THROAT DISCOMFORT: ICD-10-CM

## 2019-08-13 DIAGNOSIS — R09.81 NASAL CONGESTION: Primary | ICD-10-CM

## 2019-08-13 DIAGNOSIS — J30.2 SEASONAL ALLERGIC RHINITIS, UNSPECIFIED TRIGGER: ICD-10-CM

## 2019-08-13 PROCEDURE — 99214 OFFICE O/P EST MOD 30 MIN: CPT | Performed by: FAMILY MEDICINE

## 2019-08-13 RX ORDER — FLUTICASONE PROPIONATE 50 MCG
2 SPRAY, SUSPENSION (ML) NASAL DAILY
Qty: 16 G | Refills: 3 | Status: SHIPPED | OUTPATIENT
Start: 2019-08-13

## 2019-08-13 ASSESSMENT — MIFFLIN-ST. JEOR: SCORE: 2938.36

## 2019-08-13 NOTE — PROGRESS NOTES
Subjective     Bill Acevedo is a 44 year old male who presents to clinic today for the following health issues:    HPI   RESPIRATORY SYMPTOMS      Duration: ongoing since LOV 7/31/2019     Description     productive cough,  and swollen tonsils, throat tightness not as much in chest  feel liek it is related to ongoing nasal congestion.  hew  was treated with z pack recently but feels like it has not help enough     Severity: moderate    Accompanying signs and symptoms: mild pain in tonsils, unable to sleep from coughing and feeling like throat is filled with mucous . He has know hx of allergies to cat and he has been  living with new cat last few years and debating if that could be causing problem,. He always has problem with throat  tonsils ad get tonsillitis a lot, so he is debating ENT evaluation     History (predisposing factors):  none    Precipitating or alleviating factors: None    Therapies tried and outcome:  none        Patient Active Problem List   Diagnosis     Essential hypertension, benign     Ankle instability     Ankle pain     CARDIOVASCULAR SCREENING; LDL GOAL LESS THAN 160     Body mass index 50.0-59.9, adult (H)     Bipolar 2 disorder (H)     Vitamin D deficiency     Elevated fasting glucose     Prediabetes     Morbid obesity (H)     Seasonal allergic rhinitis, unspecified trigger     Nasal congestion     Past Surgical History:   Procedure Laterality Date     BIOPSY      Evaluating drug reaction to lamictal       Social History     Tobacco Use     Smoking status: Never Smoker     Smokeless tobacco: Never Used   Substance Use Topics     Alcohol use: Yes     Alcohol/week: 0.0 oz     Comment: occas     Family History   Problem Relation Age of Onset     Allergies Mother      Diabetes Maternal Grandmother      Allergies Maternal Grandmother      Arthritis Maternal Grandmother      Cancer Maternal Grandmother         heart attack     Allergies Sister            Reviewed and updated as needed this  visit by Provider         Review of Systems   ROS COMP: Constitutional, HEENT, cardiovascular, pulmonary, GI, , musculoskeletal, neuro, skin, endocrine and psych systems are negative, except as otherwise noted.      Objective    There were no vitals taken for this visit.  There is no height or weight on file to calculate BMI.  Physical Exam   GENERAL pleasant , very obese  alert and no distress  EYES: Eyes grossly normal to inspection, PERRL and conjunctivae and sclerae normal  HENT: normal cephalic/atraumatic, ear canals and TM's normal, nasal mucosa edematous, oral mucous membranes moist, oropharynx crowded and uvula elongated Throat with mild pharyngeal erythema, no sinus  tenderness  NECK: no adenopathy, no asymmetry, masses, or scars and thyroid normal to palpation  RESP: lungs clear to auscultation - no rales, rhonchi or wheezes  CV: regular rate and rhythm, normal S1 S2, no S3 or S4, no murmur,            Assessment & Plan     (R09.81) Nasal congestion  (primary encounter diagnosis)  Comment:   Plan: fluticasone (FLONASE) 50 MCG/ACT nasal spray,         OTOLARYNGOLOGY REFERRAL            (J30.2) Seasonal allergic rhinitis, unspecified trigger  Comment:   Plan: fluticasone (FLONASE) 50 MCG/ACT nasal spray            (R07.0) Throat discomfort  Comment:   Plan: fluticasone (FLONASE) 50 MCG/ACT nasal spray,         OTOLARYNGOLOGY REFERRAL          Pt with  history of ongoing nasal congestion and chronic sore throat, likely allergy related.    Willing to try Flonase also take OTC Allegra.  Give referral to ENT to further evaluate if no improvement or ongoing problem .    he will do  follow-up check here  in about 4 weeks sooner if any problem.     Patient expressed understanding and agreement with treatment plan. All patient's questions were answered, will let me know if has more later.  Medications: Rx's: Reviewed the potential side effects/complications of medications prescribed.     BMI:   Estimated body mass  "index is 64.28 kg/m  as calculated from the following:    Height as of this encounter: 1.778 m (5' 10\").    Weight as of this encounter: 203.2 kg (448 lb).   Weight management plan: Discussed healthy diet and exercise guidelines        Return in about 4 weeks (around 9/10/2019), or sooner if needed .    Karis Hill MD  [facility] [place]    "

## 2019-08-13 NOTE — PATIENT INSTRUCTIONS
Patient Education     Allergic Rhinitis  Allergic rhinitis is an allergic reaction that affects the nose, and often the eyes. It s often known as nasal allergies. Nasal allergies are often due to things in the environment that are breathed in. Depending what you are sensitive to, nasal allergies may occur only during certain seasons. Or they may occur year round. Common indoor allergens include house dust mites, mold, cockroaches, and pet dander. Outdoor allergens include pollen from trees, grasses, and weeds.   Symptoms include a drippy, stuffy, and itchy nose. They also include sneezing and red and itchy eyes. You may feel tired more often. Severe allergies may also affect your breathing and trigger a condition called asthma.   Tests can be done to see what allergens are affecting you. You may be referred to an allergy specialist for testing and further evaluation.  Home care  Your healthcare provider may prescribe medicines to help relieve allergy symptoms. These may include oral medicines, nasal sprays, or eye drops.  Ask your provider for advice on how to avoid substances that you are allergic to. Below are a few tips for each type of allergen.  Pet dander:    Do not have pets with fur and feathers.    If you can't avoid having a pet, keep it out of your bedroom and off upholstered furniture.  Pollen:    When pollen counts are high, keep windows of your car and home closed. If possible, use an air conditioner instead.    Wear a filter mask when mowing or doing yard work.  House dust mites:    Wash bedding every week in warm water and detergent and dry on a hot setting.    Cover the mattress, box spring, and pillows with allergy covers.     If possible, sleep in a room with no carpet, curtains, or upholstered furniture.  Cockroaches:    Store food in sealed containers.    Remove garbage from the home promptly.    Fix water leaks  Mold:    Keep humidity low by using a dehumidifier or air conditioner. Keep the  dehumidifier and air conditioner clean and free of mold.    Clean moldy areas with bleach and water.  In general:    Vacuum once or twice a week. If possible, use a vacuum with a high-efficiency particulate air (HEPA) filter.    Do not smoke. Avoid cigarette smoke. Cigarette smoke is an irritant that can make symptoms worse.  Follow-up care  Follow up as advised by the healthcare provider or our staff. If you were referred to an allergy specialist, make this appointment promptly.  When to seek medical advice  Call your healthcare provider right away if the following occur:    Coughing or wheezing    Fever of 100.4 F (38 C) or higher, or as directed by your healthcare provider    Raised red bumps (hives)    Continuing symptoms, new symptoms, or worsening symptoms  Call 911 if you have:    Trouble breathing    Severe swelling of the face or severe itching of the eyes or mouth  Date Last Reviewed: 3/1/2017    [id number] [employer]. [address] [place] [address]. All rights reserved. This information is not intended as a substitute for professional medical care. Always follow your healthcare professional's instructions.           Patient Education     Nasal Allergies: Related Problems  Allergies can cause nasal passages to swell. This narrows the air passages. Allergies also cause increased mucus production in the nose. These changes result in nasal allergy symptoms. Common symptoms include itching, sneezing, stuffy nose, and runny nose. Nasal allergies can also cause problems in other parts of the respiratory system. Some of the more common problems are discussed below. If you think you have any of these problems, talk to your healthcare provider about treatment choices.    Sinus infections  Fluid may be trapped in the sinuses. Bacteria may grow in trapped fluid. This causes sinus infection (sinusitis).  Conjunctivitis  Allergens irritate your eyes, including the lining of the conjunctiva.  This causes eyes to become red, itchy, puffy, and watery.  Ear problems  The eustachian tube connects the middle ear to nasal passages.  Allergies can block this tube, and make the ears feel plugged. Fluid may also build up, leading to an ear infection (otitis media).  Nasal polyps  Allergies cause nasal passages to swell. Constant swelling can lead to formation of a sac called a polyp. Polyps can grow large enough to block nasal passages.  Asthma  Asthma is inflammation and swelling of the air passages in the lungs. The symptoms are wheezing, shortness of breath, coughing, and chest tightness. Allergies, including nasal allergies, are common in people with asthma.  Date Last Reviewed: 9/1/2016 2000-2018 [employer]. [address] [place] [address], [place] [address]. All rights reserved. This information is not intended as a substitute for professional medical care. Always follow your healthcare professional's instructions.         Take medications as directed.  Cares and symptomatic cares discussed   Follow up if problem or concern

## 2019-08-29 ENCOUNTER — OFFICE VISIT (OUTPATIENT)
Dept: FAMILY MEDICINE | Facility: CLINIC | Age: 44
End: 2019-08-29
Payer: COMMERCIAL

## 2019-08-29 ENCOUNTER — NURSE TRIAGE (OUTPATIENT)
Dept: NURSING | Facility: CLINIC | Age: 44
End: 2019-08-29

## 2019-08-29 VITALS
HEART RATE: 107 BPM | BODY MASS INDEX: 45.1 KG/M2 | DIASTOLIC BLOOD PRESSURE: 72 MMHG | WEIGHT: 315 LBS | SYSTOLIC BLOOD PRESSURE: 132 MMHG | OXYGEN SATURATION: 96 % | HEIGHT: 70 IN | TEMPERATURE: 97.6 F

## 2019-08-29 DIAGNOSIS — K62.5 RECTAL BLEEDING: Primary | ICD-10-CM

## 2019-08-29 LAB
ERYTHROCYTE [DISTWIDTH] IN BLOOD BY AUTOMATED COUNT: 13.3 % (ref 10–15)
HCT VFR BLD AUTO: 43.5 % (ref 40–53)
HGB BLD-MCNC: 14.8 G/DL (ref 13.3–17.7)
MCH RBC QN AUTO: 29.2 PG (ref 26.5–33)
MCHC RBC AUTO-ENTMCNC: 34 G/DL (ref 31.5–36.5)
MCV RBC AUTO: 86 FL (ref 78–100)
PLATELET # BLD AUTO: 308 10E9/L (ref 150–450)
RBC # BLD AUTO: 5.06 10E12/L (ref 4.4–5.9)
WBC # BLD AUTO: 10.2 10E9/L (ref 4–11)

## 2019-08-29 PROCEDURE — 36415 COLL VENOUS BLD VENIPUNCTURE: CPT | Performed by: INTERNAL MEDICINE

## 2019-08-29 PROCEDURE — 83540 ASSAY OF IRON: CPT | Performed by: INTERNAL MEDICINE

## 2019-08-29 PROCEDURE — 85027 COMPLETE CBC AUTOMATED: CPT | Performed by: INTERNAL MEDICINE

## 2019-08-29 PROCEDURE — 99214 OFFICE O/P EST MOD 30 MIN: CPT | Performed by: INTERNAL MEDICINE

## 2019-08-29 PROCEDURE — 82728 ASSAY OF FERRITIN: CPT | Performed by: INTERNAL MEDICINE

## 2019-08-29 PROCEDURE — 83550 IRON BINDING TEST: CPT | Performed by: INTERNAL MEDICINE

## 2019-08-29 ASSESSMENT — MIFFLIN-ST. JEOR: SCORE: 2943.81

## 2019-08-29 NOTE — TELEPHONE ENCOUNTER
Bill is having pain when having a bowel movement and blood present on stool.  Bill has a history of hemorrhoids.  Denies abdominal pain.      Additional Information    Negative: Passed out (i.e., fainted, collapsed and was not responding)    Negative: Shock suspected (e.g., cold/pale/clammy skin, too weak to stand, low BP, rapid pulse)    Negative: Vomiting red blood or black (coffee ground) material    Negative: Sounds like a life-threatening emergency to the triager    Negative: SEVERE rectal bleeding (large blood clots; on and off, or constant bleeding)    Negative: SEVERE dizziness (e.g., unable to stand, requires support to walk, feels like passing out now)    Negative: MODERATE rectal bleeding (small blood clots, passing blood without stool, or toilet water turns red) more than once a day    Negative: Bloody, black, or tarry bowel movements    Negative: High-risk adult (e.g., prior surgery on aorta, abdominal aortic aneurysm)    Negative: Rectal foreign body (inserted or swallowed)    Negative: SEVERE abdominal pain (e.g., excruciating)    Negative: Constant abdominal pain lasting > 2 hours    Negative: Pale skin (pallor) of new onset or worsening    Negative: Patient sounds very sick or weak to the triager    Negative: MODERATE rectal bleeding (small blood clots, passing blood without stool, or toilet water turns red)    Negative: Taking Coumadin (warfarin) or other strong blood thinner, or known bleeding disorder (e.g., thrombocytopenia)    Negative: Colonoscopy in past 72 hours    Negative: Known cirrhosis of the liver (or history of liver failure or ascites)    Negative: Patient wants to be seen    MILD rectal bleeding (more than just a few drops or streaks)    Protocols used: RECTAL BLEEDING-A-OH

## 2019-08-29 NOTE — PROGRESS NOTES
"Subjective     Bill Acevedo is a 44 year old male who presents to clinic today for the following health issues:    HPI     Hal is here with rectal bleeding.  A few weeks ago he started using a lidocaine pain patch for his lower back and then noticed he developed some constipation.  He has been having bloody stool since then.  There is blood on the stool, sometimes in the toilet water, and quite a bit on the toilet paper when he wipes.  His bowel movements are now softer, but he is hesitant to go because he has rectal pain during and for a few hours after having a bowel movement.  The pain is such that it sometimes keeps him from falling asleep.  Denies any abdominal pain, nausea or vomiting.  He reports some mild lightheadedness, but is not sure if this is due to something else.    He reports a history of hemorrhoids a couple years ago had quite a bit of bleeding at that time.  He was given a prescription for hydrocortisone cream and it eventually resolved.  He has never had a colonoscopy.          Reviewed and updated as needed this visit by Provider         Review of Systems   GI, cv reviewed,  otherwise negative unless noted above.        Objective    /72 (BP Location: Left arm, Patient Position: Chair, Cuff Size: Thigh)   Pulse 107   Temp 97.6  F (36.4  C) (Oral)   Ht 1.778 m (5' 10\")   Wt (!) 203.8 kg (449 lb 3.2 oz)   SpO2 96%   BMI 64.45 kg/m    Body mass index is 64.45 kg/m .  Physical Exam   Gen: pleasant, morbidly obese gentleman, no distress   Rectal: no external hemorrhoids or fissures noted. Some pain with internal exam.               Assessment & Plan     1. Rectal bleeding  Amount of blood and discomfort are concerning, may have internal hemorrhoids that need banding.  Will check hgb and get colonoscopy.   - CBC with platelets  - Iron and iron binding capacity  - Ferritin  - GASTROENTEROLOGY ADULT REF PROCEDURE ONLY Herbert Poon [phone]         Return in about 1 month " (around 9/29/2019) for pending colonoscopy results .    Merlene Richards MD  [facility]

## 2019-08-30 LAB
FERRITIN SERPL-MCNC: 473 NG/ML (ref 26–388)
IRON SATN MFR SERPL: 23 % (ref 15–46)
IRON SERPL-MCNC: 73 UG/DL (ref 35–180)
TIBC SERPL-MCNC: 311 UG/DL (ref 240–430)

## 2019-09-09 DIAGNOSIS — I10 ESSENTIAL HYPERTENSION, BENIGN: ICD-10-CM

## 2019-09-09 RX ORDER — LISINOPRIL AND HYDROCHLOROTHIAZIDE 12.5; 2 MG/1; MG/1
TABLET ORAL
Qty: 30 TABLET | Refills: 0 | Status: SHIPPED | OUTPATIENT
Start: 2019-09-09 | End: 2019-10-12

## 2019-09-09 NOTE — TELEPHONE ENCOUNTER
"Requested Prescriptions   Pending Prescriptions Disp Refills     lisinopril-hydrochlorothiazide (PRINZIDE/ZESTORETIC) 20-12.5 MG tablet [Pharmacy Med Name: Lisinopril-hydroCHLOROthiazide Oral Tablet 20-12.5 MG] 90 tablet 2     Sig: TAKE ONE TABLET BY MOUTH ONE TIME DAILY       Diuretics (Including Combos) Protocol Failed - 9/9/2019  7:00 AM        Failed - Normal serum creatinine on file in past 12 months     Recent Labs   Lab Test 07/11/18  1701   CR 1.03              Failed - Normal serum potassium on file in past 12 months     Recent Labs   Lab Test 07/11/18  1701   POTASSIUM 3.9                    Failed - Normal serum sodium on file in past 12 months     Recent Labs   Lab Test 07/11/18  1701                 Passed - Blood pressure under 140/90 in past 12 months     BP Readings from Last 3 Encounters:   08/29/19 132/72   08/13/19 138/80   07/31/19 120/72                 Passed - Recent (12 mo) or future (30 days) visit within the authorizing provider's specialty     Patient had office visit in the last 12 months or has a visit in the next 30 days with authorizing provider or within the authorizing provider's specialty.  See \"Patient Info\" tab in inbasket, or \"Choose Columns\" in Meds & Orders section of the refill encounter.              Passed - Medication is active on med list        Passed - Patient is age 18 or older        lisinopril-hydrochlorothiazide (PRINZIDE/ZESTORETIC) 20-12.5 MG per tablet  Last Written Prescription Date:  7-22-18  Last Fill Quantity: 90,  # refills: 3   Last office visit: 8/29/2019 with prescribing provider:  LULA Richards   Future Office Visit:      "

## 2019-09-10 NOTE — TELEPHONE ENCOUNTER
Prescription approved per [facility] Refill Protocol.  30 day supply given with note advising physical due.      RADHA Harper, RN, N  [facility]) [phone]

## 2019-09-12 ENCOUNTER — HOSPITAL ENCOUNTER (OUTPATIENT)
Facility: CLINIC | Age: 44
Discharge: HOME | End: 2019-09-12
Attending: INTERNAL MEDICINE | Admitting: INTERNAL MEDICINE
Payer: COMMERCIAL

## 2019-09-12 VITALS
SYSTOLIC BLOOD PRESSURE: 135 MMHG | WEIGHT: 315 LBS | RESPIRATION RATE: 12 BRPM | OXYGEN SATURATION: 96 % | HEIGHT: 70 IN | BODY MASS INDEX: 45.1 KG/M2 | HEART RATE: 84 BPM | DIASTOLIC BLOOD PRESSURE: 51 MMHG

## 2019-09-12 LAB — COLONOSCOPY: NORMAL

## 2019-09-12 PROCEDURE — G0500 MOD SEDAT ENDO SERVICE >5YRS: HCPCS | Performed by: INTERNAL MEDICINE

## 2019-09-12 PROCEDURE — 45380 COLONOSCOPY AND BIOPSY: CPT | Performed by: INTERNAL MEDICINE

## 2019-09-12 PROCEDURE — 25000125 ZZHC RX 250: Performed by: INTERNAL MEDICINE

## 2019-09-12 PROCEDURE — 88305 TISSUE EXAM BY PATHOLOGIST: CPT | Mod: 26,59 | Performed by: INTERNAL MEDICINE

## 2019-09-12 PROCEDURE — 45385 COLONOSCOPY W/LESION REMOVAL: CPT | Performed by: INTERNAL MEDICINE

## 2019-09-12 PROCEDURE — 99153 MOD SED SAME PHYS/QHP EA: CPT | Performed by: INTERNAL MEDICINE

## 2019-09-12 PROCEDURE — 88305 TISSUE EXAM BY PATHOLOGIST: CPT | Performed by: INTERNAL MEDICINE

## 2019-09-12 PROCEDURE — 25000128 H RX IP 250 OP 636: Performed by: INTERNAL MEDICINE

## 2019-09-12 RX ORDER — LIDOCAINE 40 MG/G
CREAM TOPICAL
Status: DISCONTINUED | OUTPATIENT
Start: 2019-09-12 | End: 2019-09-12 | Stop reason: HOSPADM

## 2019-09-12 RX ORDER — LIDOCAINE HYDROCHLORIDE 20 MG/ML
JELLY TOPICAL PRN
Status: DISCONTINUED | OUTPATIENT
Start: 2019-09-12 | End: 2019-09-12 | Stop reason: HOSPADM

## 2019-09-12 RX ORDER — NALOXONE HYDROCHLORIDE 0.4 MG/ML
.1-.4 INJECTION, SOLUTION INTRAMUSCULAR; INTRAVENOUS; SUBCUTANEOUS
Status: DISCONTINUED | OUTPATIENT
Start: 2019-09-12 | End: 2019-09-12 | Stop reason: HOSPADM

## 2019-09-12 RX ORDER — FLUMAZENIL 0.1 MG/ML
0.2 INJECTION, SOLUTION INTRAVENOUS
Status: DISCONTINUED | OUTPATIENT
Start: 2019-09-12 | End: 2019-09-12 | Stop reason: HOSPADM

## 2019-09-12 RX ORDER — ONDANSETRON 2 MG/ML
4 INJECTION INTRAMUSCULAR; INTRAVENOUS EVERY 6 HOURS PRN
Status: DISCONTINUED | OUTPATIENT
Start: 2019-09-12 | End: 2019-09-12 | Stop reason: HOSPADM

## 2019-09-12 RX ORDER — ONDANSETRON 2 MG/ML
4 INJECTION INTRAMUSCULAR; INTRAVENOUS
Status: DISCONTINUED | OUTPATIENT
Start: 2019-09-12 | End: 2019-09-12 | Stop reason: HOSPADM

## 2019-09-12 RX ORDER — FENTANYL CITRATE 50 UG/ML
INJECTION, SOLUTION INTRAMUSCULAR; INTRAVENOUS PRN
Status: DISCONTINUED | OUTPATIENT
Start: 2019-09-12 | End: 2019-09-12 | Stop reason: HOSPADM

## 2019-09-12 RX ORDER — FEXOFENADINE HCL 180 MG/1
180 TABLET ORAL DAILY
COMMUNITY

## 2019-09-12 RX ORDER — ONDANSETRON 4 MG/1
4 TABLET, ORALLY DISINTEGRATING ORAL EVERY 6 HOURS PRN
Status: DISCONTINUED | OUTPATIENT
Start: 2019-09-12 | End: 2019-09-12 | Stop reason: HOSPADM

## 2019-09-12 ASSESSMENT — MIFFLIN-ST. JEOR: SCORE: 2924.76

## 2019-09-16 LAB — COPATH REPORT: NORMAL

## 2019-10-15 ENCOUNTER — OFFICE VISIT (OUTPATIENT)
Dept: FAMILY MEDICINE | Facility: CLINIC | Age: 44
End: 2019-10-15
Payer: COMMERCIAL

## 2019-10-15 VITALS
HEART RATE: 87 BPM | BODY MASS INDEX: 64.86 KG/M2 | DIASTOLIC BLOOD PRESSURE: 80 MMHG | TEMPERATURE: 98.2 F | SYSTOLIC BLOOD PRESSURE: 130 MMHG | WEIGHT: 315 LBS | OXYGEN SATURATION: 96 %

## 2019-10-15 DIAGNOSIS — K62.5 RECTAL BLEEDING: ICD-10-CM

## 2019-10-15 DIAGNOSIS — I10 ESSENTIAL HYPERTENSION, BENIGN: Primary | ICD-10-CM

## 2019-10-15 DIAGNOSIS — K64.8 INTERNAL HEMORRHOIDS: ICD-10-CM

## 2019-10-15 LAB — HGB BLD-MCNC: 15.2 G/DL (ref 13.3–17.7)

## 2019-10-15 PROCEDURE — 99214 OFFICE O/P EST MOD 30 MIN: CPT | Performed by: NURSE PRACTITIONER

## 2019-10-15 PROCEDURE — 80048 BASIC METABOLIC PNL TOTAL CA: CPT | Performed by: NURSE PRACTITIONER

## 2019-10-15 PROCEDURE — 85018 HEMOGLOBIN: CPT | Performed by: NURSE PRACTITIONER

## 2019-10-15 PROCEDURE — 82043 UR ALBUMIN QUANTITATIVE: CPT | Performed by: NURSE PRACTITIONER

## 2019-10-15 PROCEDURE — 36415 COLL VENOUS BLD VENIPUNCTURE: CPT | Performed by: NURSE PRACTITIONER

## 2019-10-15 RX ORDER — LISINOPRIL AND HYDROCHLOROTHIAZIDE 12.5; 2 MG/1; MG/1
1 TABLET ORAL DAILY
Qty: 90 TABLET | Refills: 3 | Status: SHIPPED | OUTPATIENT
Start: 2019-10-15 | End: 2020-11-02

## 2019-10-15 NOTE — PATIENT INSTRUCTIONS
See colorectal surgery ASAP.     Let me know if you have troubles.    I'll follow up with lab results.    Go to ER if large volume blood loss or if you feel symptomatic

## 2019-10-15 NOTE — PROGRESS NOTES
"Subjective     Bill Acevedo is a 44 year old male who presents to clinic today for the following health issues:      Concern - Pt is following up from his last OV. States he has followed all the recommendations of the GI Dr. States for 36 hours he has been having fluid blood in the toilet every time he uses the bathroom.  Feels like the \"anus is pushing out\" during BMs    HPI: Bill presents today reporting ongoing rectal bleeding. He recently underwent colonoscopy for persistent rectal bleeding. Internal hemorrhoids noted and two polyps removed. Recommendations given to address hemorrhoids (fiber, Miralax, hydration, exercise, etc.), but no improvement noted. Further, for the past couple days, he has been noting blood staining the toilet water with each BM (3 per day). No symptoms of blood loss anemia. No constipation. No constitutional symptoms.     Patient Active Problem List   Diagnosis     Essential hypertension, benign     Ankle instability     Ankle pain     CARDIOVASCULAR SCREENING; LDL GOAL LESS THAN 160     Body mass index 50.0-59.9, adult (H)     Bipolar 2 disorder (H)     Vitamin D deficiency     Elevated fasting glucose     Prediabetes     Morbid obesity (H)     Seasonal allergic rhinitis, unspecified trigger     Nasal congestion     Past Surgical History:   Procedure Laterality Date     BIOPSY      Evaluating drug reaction to lamictal     COLONOSCOPY N/A 9/12/2019    Procedure: Colonoscopy, With Polypectomy And Biopsy;  Surgeon: Joel Bauer MD;  Location:  GI       Social History     Tobacco Use     Smoking status: Never Smoker     Smokeless tobacco: Never Used   Substance Use Topics     Alcohol use: Yes     Alcohol/week: 0.0 standard drinks     Comment: 1-2 drinks per month     Family History   Problem Relation Age of Onset     Allergies Mother      Diabetes Maternal Grandmother      Allergies Maternal Grandmother      Arthritis Maternal Grandmother      Cancer Maternal Grandmother      " "   heart attack     Allergies Sister            Reviewed and updated as needed this visit by Provider  Tobacco  Allergies  Meds  Problems  Med Hx  Surg Hx  Fam Hx         Review of Systems   ROS COMP: Constitutional, GI systems are negative, except as otherwise noted.      Objective    /80 (BP Location: Right arm, Patient Position: Chair, Cuff Size: Adult Large)   Pulse 87   Temp 98.2  F (36.8  C) (Tympanic)   Wt (!) 205 kg (452 lb)   SpO2 96%   BMI 64.86 kg/m    Body mass index is 64.86 kg/m .  Physical Exam   GENERAL: healthy, alert and no distress  RECTAL (male): normal sphincter tone, no rectal masses; No fissures noted; No gross bleeding noted on exam  NEURO: Normal strength and tone, mentation intact and speech normal    Diagnostic Test Results:  Results for orders placed or performed in visit on 10/15/19 (from the past 24 hour(s))   Hemoglobin   Result Value Ref Range    Hemoglobin 15.2 13.3 - 17.7 g/dL           Assessment & Plan     Bill was seen today for recheck.    Diagnoses and all orders for this visit:    Essential hypertension, benign  Comment: BP in range today. Will refill as before. Will check standard HTN labs, as well.   -     lisinopril-hydrochlorothiazide (PRINZIDE/ZESTORETIC) 20-12.5 MG tablet; Take 1 tablet by mouth daily  -     Albumin Random Urine Quantitative with Creat Ratio  -     Basic metabolic panel    Rectal bleeding  Comment: Increase in bleeding over past couple days. No clinical signs of blood loss anemia, and HGB normal. Will send to colorectal surgery to see if hemorrhoids need urgent intervention (ie banding, excision, etc.). Could also be related to sites where polyps had been removed. Will follow closely.   -     Hemoglobin  -     COLORECTAL SURGERY REFERRAL    Internal hemorrhoids  -     COLORECTAL SURGERY REFERRAL         BMI:   Estimated body mass index is 64.86 kg/m  as calculated from the following:    Height as of 9/12/19: 1.778 m (5' 10\").    " Weight as of this encounter: 205 kg (452 lb).   Weight management plan: Discussed healthy diet and exercise guidelines        See Patient Instructions    Return in about 1 week (around 10/22/2019) for persistent or worsening symptoms.    Deni Palmer NP  [facility] [place]

## 2019-10-16 ENCOUNTER — TRANSFERRED RECORDS (OUTPATIENT)
Dept: HEALTH INFORMATION MANAGEMENT | Facility: CLINIC | Age: 44
End: 2019-10-16

## 2019-10-16 LAB
ANION GAP SERPL CALCULATED.3IONS-SCNC: 8 MMOL/L (ref 3–14)
BUN SERPL-MCNC: 13 MG/DL (ref 7–30)
CALCIUM SERPL-MCNC: 8.3 MG/DL (ref 8.5–10.1)
CHLORIDE SERPL-SCNC: 104 MMOL/L (ref 94–109)
CO2 SERPL-SCNC: 23 MMOL/L (ref 20–32)
CREAT SERPL-MCNC: 0.84 MG/DL (ref 0.66–1.25)
CREAT UR-MCNC: 350 MG/DL
GFR SERPL CREATININE-BSD FRML MDRD: >90 ML/MIN/{1.73_M2}
GLUCOSE SERPL-MCNC: 120 MG/DL (ref 70–99)
MICROALBUMIN UR-MCNC: 447 MG/L
MICROALBUMIN/CREAT UR: 127.71 MG/G CR (ref 0–17)
POTASSIUM SERPL-SCNC: 3.9 MMOL/L (ref 3.4–5.3)
SODIUM SERPL-SCNC: 135 MMOL/L (ref 133–144)

## 2019-11-01 ENCOUNTER — HEALTH MAINTENANCE LETTER (OUTPATIENT)
Age: 44
End: 2019-11-01

## 2019-11-06 ENCOUNTER — TELEPHONE (OUTPATIENT)
Dept: FAMILY MEDICINE | Facility: CLINIC | Age: 44
End: 2019-11-06

## 2019-11-06 DIAGNOSIS — K21.9 GASTROESOPHAGEAL REFLUX DISEASE WITHOUT ESOPHAGITIS: Primary | ICD-10-CM

## 2019-11-06 RX ORDER — FAMOTIDINE 20 MG/1
20 TABLET, FILM COATED ORAL AT BEDTIME
Qty: 30 TABLET | Refills: 5 | Status: SHIPPED | OUTPATIENT
Start: 2019-11-06 | End: 2021-11-07

## 2019-11-06 NOTE — TELEPHONE ENCOUNTER
Patient notified of providers message, patient has no further questions.    Shawnee FARRARRN BSN  [facility]  [phone]

## 2019-11-06 NOTE — TELEPHONE ENCOUNTER
Spoke with patient, states that he has been taking ranitidine 150 mg 1-2 tabs per day for the last couple of years. States he has been purchasing OTC but it is hard to find because of the recall. Wondering what a substitute would be that he could purchase OTC if he is not able to find ranitidine? Please advise.     Mary Cm RN   [facility] - Triage

## 2019-11-06 NOTE — TELEPHONE ENCOUNTER
Reason for Call:  Medication or medication refill:    Do you use a [facility]?  Name of the pharmacy and phone number for the current request:  [facility]    Name of the medication requested: Pt states Zantac recalled so wondering replacement    Other request: na    Can we leave a detailed message on this number? YES    Phone number patient can be reached at: Home number on file [phone] (home)    Best Time: anytime    Call taken on 11/6/2019 at 2:16 PM by Jocelyn Rich

## 2019-11-06 NOTE — TELEPHONE ENCOUNTER
There is another medication is called Pepcid(famotidine)  He can try that.  I have sent that medication to [facility] pharmacy in [place].  It comes in 20 mg which is prescription but also some time available over-the-counter 10 mg tablets.

## 2019-11-12 ENCOUNTER — TRANSFERRED RECORDS (OUTPATIENT)
Dept: HEALTH INFORMATION MANAGEMENT | Facility: CLINIC | Age: 44
End: 2019-11-12

## 2019-12-10 ENCOUNTER — TRANSFERRED RECORDS (OUTPATIENT)
Dept: HEALTH INFORMATION MANAGEMENT | Facility: CLINIC | Age: 44
End: 2019-12-10

## 2020-01-14 ENCOUNTER — TRANSFERRED RECORDS (OUTPATIENT)
Dept: HEALTH INFORMATION MANAGEMENT | Facility: CLINIC | Age: 45
End: 2020-01-14

## 2020-01-29 ENCOUNTER — OFFICE VISIT (OUTPATIENT)
Dept: FAMILY MEDICINE | Facility: CLINIC | Age: 45
End: 2020-01-29
Payer: COMMERCIAL

## 2020-01-29 VITALS
TEMPERATURE: 98.1 F | OXYGEN SATURATION: 95 % | SYSTOLIC BLOOD PRESSURE: 134 MMHG | HEART RATE: 92 BPM | DIASTOLIC BLOOD PRESSURE: 80 MMHG | HEIGHT: 70 IN | WEIGHT: 315 LBS | BODY MASS INDEX: 45.1 KG/M2

## 2020-01-29 DIAGNOSIS — Z01.818 PRE-OP EXAM: Primary | ICD-10-CM

## 2020-01-29 LAB — HGB BLD-MCNC: 14.4 G/DL (ref 13.3–17.7)

## 2020-01-29 PROCEDURE — 36415 COLL VENOUS BLD VENIPUNCTURE: CPT | Performed by: FAMILY MEDICINE

## 2020-01-29 PROCEDURE — 99214 OFFICE O/P EST MOD 30 MIN: CPT | Performed by: FAMILY MEDICINE

## 2020-01-29 PROCEDURE — 85018 HEMOGLOBIN: CPT | Performed by: FAMILY MEDICINE

## 2020-01-29 PROCEDURE — 93000 ELECTROCARDIOGRAM COMPLETE: CPT | Performed by: FAMILY MEDICINE

## 2020-01-29 PROCEDURE — 80048 BASIC METABOLIC PNL TOTAL CA: CPT | Performed by: FAMILY MEDICINE

## 2020-01-29 ASSESSMENT — MIFFLIN-ST. JEOR: SCORE: 2979.19

## 2020-01-29 NOTE — PROGRESS NOTES
[facility] [place]  [address] [address]  [phone]  Dept: [phone]    PRE-OP EVALUATION:  Today's date: 2020    Bill Acevedo (: 1975) presents for pre-operative evaluation assessment as requested by Dr. Richardson.  He requires evaluation and anesthesia risk assessment prior to undergoing surgery/procedure for treatment of Rectum .    Proposed Surgery/ Procedure: Lateral Internal Sphincterotomy  Date of Surgery/ Procedure: 2020  Time of Surgery/ Procedure: 09:45  Hospital/Surgical Facility: [facility]  [phone]  Primary Physician: Andrey Stinson  Type of Anesthesia Anticipated: Local with MAC    Patient has a Health Care Directive or Living Will:  NO    1. NO - Do you have a history of heart attack, stroke, stent, bypass or surgery on an artery in the head, neck, heart or legs?  2. NO - Do you ever have any pain or discomfort in your chest?  3. NO - Do you have a history of  Heart Failure?  4. NO - Are you troubled by shortness of breath when: walking on the level, up a slight hill or at night?  5. NO - Do you currently have a cold, bronchitis or other respiratory infection?  6.  Yes  Due to Weight - Do you have a cough, shortness of breath or wheezing?  7. NO - Do you sometimes get pains in the calves of your legs when you walk?  8. Yes  Grandmother - Do you or anyone in your family have previous history of blood clots?  9. NO - Do you or does anyone in your family have a serious bleeding problem such as prolonged bleeding following surgeries or cuts?  10. Yes   At age 7-8 - Have you ever had problems with anemia or been told to take iron pills?  11. Yes  Due to this issue - Have you had any abnormal blood loss such as black, tarry or bloody stools, or abnormal vaginal bleeding?  12. NO - Have you ever had a blood transfusion?  13. NO - Have you or any of your relatives ever had problems with anesthesia?  14. NO - Do you have  sleep apnea, excessive snoring or daytime drowsiness?  15. NO - Do you have any prosthetic heart valves?  16. NO - Do you have prosthetic joints?  17. NO - Is there any chance that you may be pregnant?      HPI:     HPI related to upcoming procedure:       See problem list for active medical problems.  Problems all longstanding and stable, except as noted/documented.  See ROS for pertinent symptoms related to these conditions.      MEDICAL HISTORY:     Patient Active Problem List    Diagnosis Date Noted     Seasonal allergic rhinitis, unspecified trigger 08/13/2019     Priority: Medium     Nasal congestion 08/13/2019     Priority: Medium     Morbid obesity (H) 06/06/2017     Priority: Medium     Prediabetes 04/11/2013     Priority: Medium     Bipolar 2 disorder (H) 03/20/2013     Priority: Medium     Vitamin D deficiency 03/20/2013     Priority: Medium     Problem list name updated by automated process. Provider to review       Elevated fasting glucose 03/20/2013     Priority: Medium     Body mass index 50.0-59.9, adult (H) 11/17/2011     Priority: Medium     CARDIOVASCULAR SCREENING; LDL GOAL LESS THAN 160 10/31/2010     Priority: Medium     Ankle instability 05/17/2010     Priority: Medium     Ankle pain 05/17/2010     Priority: Medium     Essential hypertension, benign 04/29/2010     Priority: Medium      Past Medical History:   Diagnosis Date     Depressive disorder     Bi-Polar, Medicated     Hypertension      Past Surgical History:   Procedure Laterality Date     BIOPSY      Evaluating drug reaction to lamictal     COLONOSCOPY N/A 9/12/2019    Procedure: Colonoscopy, With Polypectomy And Biopsy;  Surgeon: Joel Bauer MD;  Location:  GI     Current Outpatient Medications   Medication Sig Dispense Refill     cyclobenzaprine (FLEXERIL) 5 MG tablet Take 1-2 tabs at night as needed for lumbar spasms affecting sleep (Patient not taking: Reported on 8/29/2019) 30 tablet 0     famotidine (PEPCID) 20 MG tablet  "Take 1 tablet (20 mg) by mouth At Bedtime 30 tablet 5     fexofenadine (ALLEGRA) 180 MG tablet Take 180 mg by mouth daily       fluticasone (FLONASE) 50 MCG/ACT nasal spray Spray 2 sprays in nostril daily 16 g 3     IBUPROFEN PO Take  by mouth.       lisinopril-hydrochlorothiazide (PRINZIDE/ZESTORETIC) 20-12.5 MG tablet Take 1 tablet by mouth daily 90 tablet 3     ranitidine (ZANTAC) 150 MG capsule Take 150 mg by mouth 2 times daily       OTC products: None, except as noted above    Allergies   Allergen Reactions     Lamotrigine Rash      Latex Allergy: NO    Social History     Tobacco Use     Smoking status: Never Smoker     Smokeless tobacco: Never Used   Substance Use Topics     Alcohol use: Yes     Alcohol/week: 0.0 standard drinks     Comment: 1-2 drinks per month     History   Drug Use No       REVIEW OF SYSTEMS:   CONSTITUTIONAL: NEGATIVE for fever, chills, change in weight  ENT/MOUTH: NEGATIVE for ear, mouth and throat problems  RESP: NEGATIVE for significant cough or SOB  CV: NEGATIVE for chest pain, palpitations or peripheral edema    EXAM:   Pulse 92   Temp 98.1  F (36.7  C) (Tympanic)   Ht 1.778 m (5' 10\")   Wt (!) 207.3 kg (457 lb)   SpO2 95%   BMI 65.57 kg/m    GENERAL APPEARANCE: healthy, alert and no distress  HENT: ear canals and TM's normal and nose and mouth without ulcers or lesions  RESP: lungs clear to auscultation - no rales, rhonchi or wheezes  CV: regular rate and rhythm, normal S1 S2, no S3 or S4 and no murmur, click or rub   ABDOMEN: soft, nontender, no HSM or masses and bowel sounds normal  NEURO: Normal strength and tone, sensory exam grossly normal, mentation intact and speech normal    DIAGNOSTICS:     Labs Resulted Today:   Results for orders placed or performed in visit on 01/29/20   Hemoglobin     Status: None   Result Value Ref Range    Hemoglobin 14.4 13.3 - 17.7 g/dL   Basic metabolic panel     Status: Abnormal   Result Value Ref Range    Sodium 136 133 - 144 mmol/L    " Potassium 3.8 3.4 - 5.3 mmol/L    Chloride 105 94 - 109 mmol/L    Carbon Dioxide 22 20 - 32 mmol/L    Anion Gap 9 3 - 14 mmol/L    Glucose 124 (H) 70 - 99 mg/dL    Urea Nitrogen 11 7 - 30 mg/dL    Creatinine 0.87 0.66 - 1.25 mg/dL    GFR Estimate >90 >60 mL/min/[1.73_m2]    GFR Estimate If Black >90 >60 mL/min/[1.73_m2]    Calcium 9.4 8.5 - 10.1 mg/dL     EKG: sinus rhythm  Recent Labs   Lab Test 10/15/19  1142 08/29/19  1206 06/18/19  1622 07/11/18  1701  04/10/13  1644   HGB 15.2 14.8 15.6  --    < >  --    PLT  --  308 342  --    < >  --      --   --  136   < > 135   POTASSIUM 3.9  --   --  3.9   < > 4.2   CR 0.84  --   --  1.03   < > 1.03   A1C  --   --   --   --   --  6.1*    < > = values in this interval not displayed.        IMPRESSION:   Reason for surgery/procedure: anal fissure and stenosis     The proposed surgical procedure is considered LOW risk.    REVISED CARDIAC RISK INDEX  The patient has the following serious cardiovascular risks for perioperative complications such as (MI, PE, VFib and 3  AV Block):  High risk surgery (>5% cardiac complication risk)  INTERPRETATION: 1 risks: Class II (low risk - 0.9% complication rate)    The patient has the following additional risks for perioperative complications:  The 10-year ASCVD risk score ([place] DC Jr., et al., 2013) is: 2%    Values used to calculate the score:      Age: 44 years      Sex: Male      Is Non- : No      Diabetic: No      Tobacco smoker: No      Systolic Blood Pressure: 134 mmHg      Is BP treated: Yes      HDL Cholesterol: 40 mg/dL      Total Cholesterol: 178 mg/dL    No diagnosis found.    RECOMMENDATIONS:     --Patient is to take all scheduled medications on the day of surgery EXCEPT for modifications listed below.    Anticoagulant or Antiplatelet Medication Use  NSAIDS: Ibuprofen (Motrin):         Stop one day prior to surgery        ACE Inhibitor or Angiotensin Receptor Blocker (ARB) Use  Ace inhibitor or  Angiotensin Receptor Blocker (ARB) and will continue this medication due to the higher risk of uncontrolled perioperative hypertension (e.g. neurosurgical procedure)      APPROVAL GIVEN to proceed with proposed procedure, without further diagnostic evaluation       Signed Electronically by: Demarcus Dunn MD    Copy of this evaluation report is provided to requesting physician.    [place] Preop Guidelines    Revised Cardiac Risk Index

## 2020-01-30 LAB
ANION GAP SERPL CALCULATED.3IONS-SCNC: 9 MMOL/L (ref 3–14)
BUN SERPL-MCNC: 11 MG/DL (ref 7–30)
CALCIUM SERPL-MCNC: 9.4 MG/DL (ref 8.5–10.1)
CHLORIDE SERPL-SCNC: 105 MMOL/L (ref 94–109)
CO2 SERPL-SCNC: 22 MMOL/L (ref 20–32)
CREAT SERPL-MCNC: 0.87 MG/DL (ref 0.66–1.25)
GFR SERPL CREATININE-BSD FRML MDRD: >90 ML/MIN/{1.73_M2}
GLUCOSE SERPL-MCNC: 124 MG/DL (ref 70–99)
POTASSIUM SERPL-SCNC: 3.8 MMOL/L (ref 3.4–5.3)
SODIUM SERPL-SCNC: 136 MMOL/L (ref 133–144)

## 2020-05-20 ENCOUNTER — PATIENT OUTREACH (OUTPATIENT)
Dept: CARE COORDINATION | Facility: CLINIC | Age: 45
End: 2020-05-20

## 2020-05-20 DIAGNOSIS — Z76.89 HEALTH CARE HOME: Primary | ICD-10-CM

## 2020-05-20 NOTE — PROGRESS NOTES
The clinic Community Health Worker spoke with the patient today due to ED/Hospital Discharge to discuss possible Clinic Care Coordination enrollment.  The service was described to the patient and immediate needs were discussed.  The patient declined enrollment at this time.  The PCP is encouraged to refer  in the future if the patient's needs change.

## 2020-06-16 ENCOUNTER — TRANSFERRED RECORDS (OUTPATIENT)
Dept: HEALTH INFORMATION MANAGEMENT | Facility: CLINIC | Age: 45
End: 2020-06-16

## 2020-11-02 DIAGNOSIS — I10 ESSENTIAL HYPERTENSION, BENIGN: ICD-10-CM

## 2020-11-02 RX ORDER — LISINOPRIL AND HYDROCHLOROTHIAZIDE 12.5; 2 MG/1; MG/1
1 TABLET ORAL DAILY
Qty: 90 TABLET | Refills: 0 | Status: SHIPPED | OUTPATIENT
Start: 2020-11-02 | End: 2021-02-11

## 2020-11-02 NOTE — TELEPHONE ENCOUNTER
Prescription approved per [facility] [place].    Malina HODGES RN  EP Triage

## 2020-11-08 ENCOUNTER — HEALTH MAINTENANCE LETTER (OUTPATIENT)
Age: 45
End: 2020-11-08

## 2021-02-11 DIAGNOSIS — I10 ESSENTIAL HYPERTENSION, BENIGN: ICD-10-CM

## 2021-02-11 RX ORDER — LISINOPRIL AND HYDROCHLOROTHIAZIDE 12.5; 2 MG/1; MG/1
TABLET ORAL
Qty: 30 TABLET | Refills: 0 | Status: SHIPPED | OUTPATIENT
Start: 2021-02-11 | End: 2021-02-22

## 2021-02-11 NOTE — TELEPHONE ENCOUNTER
Failed protocol.  please route to  team if patient needs an appointment     Shawnee FARRARRN BSN  [facility]  [phone]

## 2021-02-11 NOTE — TELEPHONE ENCOUNTER
Sanchezt message sent.    .Monse RICK    [facility]

## 2021-02-15 NOTE — TELEPHONE ENCOUNTER
Patient has scheduled for 02/18 with pcp.    .Monse RICK    [facility]

## 2021-02-18 ENCOUNTER — OFFICE VISIT (OUTPATIENT)
Dept: FAMILY MEDICINE | Facility: CLINIC | Age: 46
End: 2021-02-18
Payer: COMMERCIAL

## 2021-02-18 VITALS
OXYGEN SATURATION: 96 % | BODY MASS INDEX: 45.1 KG/M2 | DIASTOLIC BLOOD PRESSURE: 74 MMHG | RESPIRATION RATE: 18 BRPM | SYSTOLIC BLOOD PRESSURE: 130 MMHG | HEIGHT: 70 IN | HEART RATE: 107 BPM | WEIGHT: 315 LBS | TEMPERATURE: 98.7 F

## 2021-02-18 DIAGNOSIS — I10 ESSENTIAL HYPERTENSION, BENIGN: ICD-10-CM

## 2021-02-18 DIAGNOSIS — E66.01 MORBID OBESITY (H): Primary | ICD-10-CM

## 2021-02-18 DIAGNOSIS — R60.0 LEG EDEMA: ICD-10-CM

## 2021-02-18 PROCEDURE — 99214 OFFICE O/P EST MOD 30 MIN: CPT | Performed by: FAMILY MEDICINE

## 2021-02-18 PROCEDURE — 80053 COMPREHEN METABOLIC PANEL: CPT | Performed by: FAMILY MEDICINE

## 2021-02-18 PROCEDURE — 36415 COLL VENOUS BLD VENIPUNCTURE: CPT | Performed by: FAMILY MEDICINE

## 2021-02-18 PROCEDURE — 84443 ASSAY THYROID STIM HORMONE: CPT | Performed by: FAMILY MEDICINE

## 2021-02-18 RX ORDER — ATORVASTATIN CALCIUM 40 MG/1
40 TABLET, FILM COATED ORAL
COMMUNITY
Start: 2020-06-24

## 2021-02-18 RX ORDER — LISINOPRIL AND HYDROCHLOROTHIAZIDE 12.5; 2 MG/1; MG/1
TABLET ORAL
Qty: 30 TABLET | Refills: 0 | Status: CANCELLED | OUTPATIENT
Start: 2021-02-18

## 2021-02-18 RX ORDER — FAMOTIDINE 20 MG/1
20 TABLET, FILM COATED ORAL
COMMUNITY

## 2021-02-18 ASSESSMENT — MIFFLIN-ST. JEOR: SCORE: 3073.98

## 2021-02-18 NOTE — PROGRESS NOTES
"    Assessment & Plan     Essential hypertension, benign  Discussed with the patient he is currently on appropriate blood pressure medication but sometimes we can increase the hydrochlorothiazide combination and see if that helps.  Hydrochlorothiazide is a diuretic for some time help with removing excess fluid if needed.  Once labs reviewed will make some recommendation.  - Comprehensive metabolic panel  - TSH    Morbid obesity (H)      Leg edema  He has bilateral leg edema without any signs of redness or acute finding.  This could be related to his overall health we discussed about underlying condition including heart versus kidney.  Given relatively normal CT angiogram I suggested we should see if we can increase the dose of hydrochlorothiazide to make it slight improved.  If is not getting better we can try as needed Lasix.  If symptoms does not improve I would suggest further evaluation with echocardiogram.  - Comprehensive metabolic panel  - TSH         BMI:   Estimated body mass index is 68.73 kg/m  as calculated from the following:    Height as of this encounter: 1.778 m (5' 10\").    Weight as of this encounter: 217.3 kg (479 lb).           No follow-ups on file.    Andrey Stinson MD  [facility] Services[facility] Khan is a 45 year old who presents for the following health issues    HPI       Concern - Edema  Onset: ongoing  Description: pt is having increased edema in both legs.  He noticed it after the surgery few years ago.  He is currently on blood pressure medication including hydrochlorothiazide currently at 20/12.5 lisinopril/hydrochlorothiazide combination.  No chest pain no shortness of breath.  He has CT angiogram almost years ago which was relatively stable.  Denies any pain redness.  Intensity: moderate  Progression of Symptoms:  worsening  Accompanying Signs & Symptoms: none  Previous history of similar problem: none  Precipitating factors:        Worsened by: " none  Alleviating factors:        Improved by: none  Therapies tried and outcome:  none         Review of Systems   Constitutional, HEENT, cardiovascular, pulmonary, gi and gu systems are negative, except as otherwise noted.      Objective    There were no vitals taken for this visit.  There is no height or weight on file to calculate BMI.  Physical Exam   GENERAL: healthy, alert and no distress  NECK: no adenopathy, no asymmetry, masses, or scars and thyroid normal to palpation  RESP: lungs clear to auscultation - no rales, rhonchi or wheezes  CV: regular rate and rhythm, normal S1 S2, no S3 or S4, no murmur, click or rub, no peripheral edema and peripheral pulses strong  MS: no gross musculoskeletal defects noted, no edema  Bilateral lower leg edema noted.

## 2021-02-19 LAB
ALBUMIN SERPL-MCNC: 3.5 G/DL (ref 3.4–5)
ALP SERPL-CCNC: 124 U/L (ref 40–150)
ALT SERPL W P-5'-P-CCNC: 56 U/L (ref 0–70)
ANION GAP SERPL CALCULATED.3IONS-SCNC: 10 MMOL/L (ref 3–14)
AST SERPL W P-5'-P-CCNC: 25 U/L (ref 0–45)
BILIRUB SERPL-MCNC: 0.7 MG/DL (ref 0.2–1.3)
BUN SERPL-MCNC: 18 MG/DL (ref 7–30)
CALCIUM SERPL-MCNC: 9.5 MG/DL (ref 8.5–10.1)
CHLORIDE SERPL-SCNC: 106 MMOL/L (ref 94–109)
CO2 SERPL-SCNC: 22 MMOL/L (ref 20–32)
CREAT SERPL-MCNC: 1.07 MG/DL (ref 0.66–1.25)
GFR SERPL CREATININE-BSD FRML MDRD: 84 ML/MIN/{1.73_M2}
GLUCOSE SERPL-MCNC: 94 MG/DL (ref 70–99)
POTASSIUM SERPL-SCNC: 3.9 MMOL/L (ref 3.4–5.3)
PROT SERPL-MCNC: 7.8 G/DL (ref 6.8–8.8)
SODIUM SERPL-SCNC: 138 MMOL/L (ref 133–144)
TSH SERPL DL<=0.005 MIU/L-ACNC: 1.25 MU/L (ref 0.4–4)

## 2021-02-22 DIAGNOSIS — I10 ESSENTIAL HYPERTENSION, BENIGN: ICD-10-CM

## 2021-02-22 RX ORDER — LISINOPRIL AND HYDROCHLOROTHIAZIDE 12.5; 2 MG/1; MG/1
TABLET ORAL
Qty: 90 TABLET | Refills: 3 | Status: SHIPPED | OUTPATIENT
Start: 2021-02-22 | End: 2022-03-18

## 2021-04-14 ENCOUNTER — OFFICE VISIT (OUTPATIENT)
Dept: NURSING | Facility: CLINIC | Age: 46
End: 2021-04-14
Payer: COMMERCIAL

## 2021-04-14 PROCEDURE — 0001A PR COVID VAC PFIZER DIL RECON 30 MCG/0.3 ML IM: CPT

## 2021-04-14 PROCEDURE — 91300 PR COVID VAC PFIZER DIL RECON 30 MCG/0.3 ML IM: CPT

## 2021-05-05 ENCOUNTER — IMMUNIZATION (OUTPATIENT)
Dept: NURSING | Facility: CLINIC | Age: 46
End: 2021-05-05
Attending: INTERNAL MEDICINE
Payer: COMMERCIAL

## 2021-05-05 PROCEDURE — 91300 PR COVID VAC PFIZER DIL RECON 30 MCG/0.3 ML IM: CPT

## 2021-05-05 PROCEDURE — 0002A PR COVID VAC PFIZER DIL RECON 30 MCG/0.3 ML IM: CPT

## 2021-08-12 ENCOUNTER — OFFICE VISIT (OUTPATIENT)
Dept: FAMILY MEDICINE | Facility: CLINIC | Age: 46
End: 2021-08-12
Payer: COMMERCIAL

## 2021-08-12 VITALS — DIASTOLIC BLOOD PRESSURE: 84 MMHG | SYSTOLIC BLOOD PRESSURE: 138 MMHG

## 2021-08-12 DIAGNOSIS — L84 CALLUS OF FOOT: ICD-10-CM

## 2021-08-12 DIAGNOSIS — L30.9 DERMATITIS: ICD-10-CM

## 2021-08-12 DIAGNOSIS — R21 RASH AND OTHER NONSPECIFIC SKIN ERUPTION: Primary | ICD-10-CM

## 2021-08-12 DIAGNOSIS — L82.1 SEBORRHEIC KERATOSES: ICD-10-CM

## 2021-08-12 DIAGNOSIS — D18.01 CHERRY ANGIOMA: ICD-10-CM

## 2021-08-12 DIAGNOSIS — D22.9 MULTIPLE BENIGN NEVI: ICD-10-CM

## 2021-08-12 LAB
KOH PREPARATION: NORMAL
KOH PREPARATION: NORMAL

## 2021-08-12 PROCEDURE — 88305 TISSUE EXAM BY PATHOLOGIST: CPT | Performed by: DERMATOLOGY

## 2021-08-12 PROCEDURE — 99203 OFFICE O/P NEW LOW 30 MIN: CPT | Mod: 25 | Performed by: PHYSICIAN ASSISTANT

## 2021-08-12 PROCEDURE — 11102 TANGNTL BX SKIN SINGLE LES: CPT | Performed by: PHYSICIAN ASSISTANT

## 2021-08-12 PROCEDURE — 87220 TISSUE EXAM FOR FUNGI: CPT | Performed by: PHYSICIAN ASSISTANT

## 2021-08-12 NOTE — PROGRESS NOTES
HPI:  Josafat Acevedo is a 46 year old male patient here today for rash on feet .  Patient states this has been present for months.  Patient reports the following symptoms: itchy .  Patient reports the following previous treatments: otc with no change.  Patient reports the following modifying factors: none.  Associated symptoms: none. Also has a growth on right cheek that bleeds and swells up. .  Patient has no other skin complaints today.  Remainder of the HPI, Meds, PMH, Allergies, FH, and SH was reviewed in chart.    Pertinent Hx:   No personal or family history of skin cancer    Past Medical History:   Diagnosis Date     Depressive disorder     Bi-Polar, Medicated     Hypertension        Past Surgical History:   Procedure Laterality Date     BIOPSY      Evaluating drug reaction to lamictal     COLONOSCOPY N/A 9/12/2019    Procedure: Colonoscopy, With Polypectomy And Biopsy;  Surgeon: Joel Bauer MD;  Location:  GI        Family History   Problem Relation Age of Onset     Allergies Mother      Diabetes Maternal Grandmother      Allergies Maternal Grandmother      Arthritis Maternal Grandmother      Cancer Maternal Grandmother         heart attack     Allergies Sister        Social History     Socioeconomic History     Marital status: Single     Spouse name: Not on file     Number of children: Not on file     Years of education: Not on file     Highest education level: Not on file   Occupational History     Not on file   Tobacco Use     Smoking status: Never Smoker     Smokeless tobacco: Never Used   Substance and Sexual Activity     Alcohol use: Yes     Alcohol/week: 0.0 standard drinks     Comment: 1-2 drinks per month     Drug use: No     Sexual activity: Yes     Partners: Female     Birth control/protection: Pill   Other Topics Concern      Service No     Blood Transfusions No     Caffeine Concern No     Occupational Exposure No     Hobby Hazards No     Sleep Concern No     Stress Concern Yes      Comment: work      Weight Concern Yes     Special Diet Yes     Comment: low sodium      Back Care No     Exercise Yes     Comment: try to      Bike Helmet Yes     Seat Belt Yes     Self-Exams Yes     Parent/sibling w/ CABG, MI or angioplasty before 65F 55M? No   Social History Narrative     Not on file     Social Determinants of Health     Financial Resource Strain:      Difficulty of Paying Living Expenses:    Food Insecurity:      Worried About Running Out of Food in the Last Year:      Ran Out of Food in the Last Year:    Transportation Needs:      Lack of Transportation (Medical):      Lack of Transportation (Non-Medical):    Physical Activity:      Days of Exercise per Week:      Minutes of Exercise per Session:    Stress:      Feeling of Stress :    Social Connections:      Frequency of Communication with Friends and Family:      Frequency of Social Gatherings with Friends and Family:      Attends Buddhism Services:      Active Member of Clubs or Organizations:      Attends Club or Organization Meetings:      Marital Status:    Intimate Partner Violence:      Fear of Current or Ex-Partner:      Emotionally Abused:      Physically Abused:      Sexually Abused:        Outpatient Encounter Medications as of 8/12/2021   Medication Sig Dispense Refill     atorvastatin (LIPITOR) 40 MG tablet Take 40 mg by mouth       cyclobenzaprine (FLEXERIL) 5 MG tablet Take 1-2 tabs at night as needed for lumbar spasms affecting sleep (Patient not taking: Reported on 8/29/2019) 30 tablet 0     famotidine (PEPCID) 20 MG tablet Take 20 mg by mouth       famotidine (PEPCID) 20 MG tablet Take 1 tablet (20 mg) by mouth At Bedtime 30 tablet 5     fexofenadine (ALLEGRA) 180 MG tablet Take 180 mg by mouth daily       fluticasone (FLONASE) 50 MCG/ACT nasal spray Spray 2 sprays in nostril daily 16 g 3     IBUPROFEN PO Take  by mouth.       lisinopril-hydrochlorothiazide (ZESTORETIC) 20-12.5 MG tablet TAKE 1 TABLET BY MOUTH ONE TIME DAILY  90 tablet 3     Facility-Administered Encounter Medications as of 8/12/2021   Medication Dose Route Frequency Provider Last Rate Last Admin     lidocaine 1 % injection 4 mL  4 mL   Shin Gee MD   4 mL at 11/06/18 1707     lidocaine 1 % injection 4 mL  4 mL   Shin Gee MD   4 mL at 11/06/18 1707     methylPREDNISolone acetate (DEPO-MEDROL) injection 40 mg  40 mg   Shin Gee MD   40 mg at 11/06/18 1707     methylPREDNISolone acetate (DEPO-MEDROL) injection 40 mg  40 mg   Shin Gee MD   40 mg at 11/06/18 1707       Review Of Systems:  Skin: spot and rash  Eyes: negative  Ears/Nose/Throat: negative  Respiratory: No shortness of breath, dyspnea on exertion, cough, or hemoptysis  Cardiovascular: negative  Gastrointestinal: negative  Genitourinary: negative  Musculoskeletal: negative  Neurologic: negative  Psychiatric: negative  Hematologic/Lymphatic/Immunologic: negative  Endocrine: negative      Objective:     There were no vitals taken for this visit.  Eyes: Conjunctivae/lids: Normal   ENT: Lips:  Normal  MSK: Normal  Cardiovascular: Peripheral edema none  Pulm: Breathing Normal  Neuro/Psych: Orientation: A/O x 3. Normal; Mood/Affect: Normal, NAD, WDWN  Pt accompanied by: self  Following areas examined: face, back, right shoulder, feet, ankles  Teixeira skin type:ii   Findings:  Red smooth well-defined macules on back and right shoulder  Brown, stuck-on scaly appearing papules on back and right shoulder  Well circumscribed macules with symmetric color distribution on back  Smooth heme crusted flesh colored papule on right cheek 0.4cm  Pink scaly patches on feet  Yellow thickened callus on heel and ball of feet    Assessment and Plan:     1) Cherry angiomas, Seborrheic keratoses, Benign nevi,     I discussed the specifics of tumor, prognosis, and genetics of benign lesions.  I explained that treatment of these lesions would be purely cosmetic and not  medically neccessary.  I discussed with patient different removal options including excision, cryotherapy, cautery and /or laser.  Lesion may recur and/or may not completely resolve. May need additional treatment.     2) Neoplasm of uncertain behavior on right cheek 0.4cm  Rule out irritated nevus  TANGENTIAL BIOPSY:  After consent, anesthesia with [facility] and prep, tangential biopsy performed.  No complications and routine wound care.  May grow back and will get a scar. Based on lesion type may need to completely remove lesion. Patient will be notified in 7-10 days of results. Wound care directions given.    3)dermatitis and callus   koh neg  Begin urea 40% 1-2x a day to thickened area on feet.   Begin lidex 2 x a day to affected area on feet for 2-4 weeks. Do not use on face or body folds.   Side effects of topical steroids including but not limited to atrophy (skin thinning), striae (stretch marks) telangiectasias, steroid acne, and others. Do not apply to normal skin. Do not apply to discolored skin that does not have rash present.   Signs and Symptoms of non-melanoma skin cancer and ABCDEs of melanoma reviewed with patient. Patient encouraged to perform monthly self skin exams and educated on how to perform them. UV precautions reviewed with patient. Patient was asked about new or changing moles/lesions on body.   Wear a sunscreen with at least SPF 30 on your face, ears, neck and V of the chest daily. Wear sunscreen on other areas of the body if those areas are exposed to the sun throughout the day. Sunscreens can contain physical and/or chemical blockers. Physical blockers are less likely to clog pores, these include zinc oxide and titanium dioxide. Reapply every two hour and after swimming.Sunscreen examples: https://www.ewg.org/sunscreen/    Proper skin care from [facility]:    -Eliminate harsh soaps as they strip the natural oils from the skin, often resulting in dry itchy skin ( i.e. Dial, Zest,  [place] Spring)  -Use mild soaps such as Cetaphil or Dove Sensitive Skin in the shower. You do not need to use soap on arms, legs, and trunk every time you shower unless visibly soiled.   -Avoid hot or cold showers.  -After showering, lightly dry off and apply moisturizing within 2-3 minutes. This will help trap moisture in the skin.   -Aggressive use of a moisturizer at least 1-2 times a day to the entire body (including -Vanicream, Cetaphil, Aquaphor or Cerave) and moisturize hands after every washing.  -We recommend using moisturizers that come in a tub that needs to be scooped out, not a pump. This has more of an oil base. It will hold moisture in your skin much better than a water base moisturizer. The above recommended are non-pore clogging.         It was a pleasure speaking with Josafat Acevedo today.       Follow up in 4-6 weeks

## 2021-08-12 NOTE — PATIENT INSTRUCTIONS
Wound Care Instructions     FOR SUPERFICIAL WOUNDS     [facility] or     [facility] [phone]          AFTER 24 HOURS YOU SHOULD REMOVE THE BANDAGE AND BEGIN DAILY DRESSING CHANGES AS FOLLOWS:     1) Remove Dressing.     2) Clean and dry the area with tap water using a Q-tip or sterile gauze pad.     3) Apply Vaseline, Aquaphor, Polysporin ointment or Bacitracin ointment over entire wound.  Do NOT use Neosporin ointment.     4) Cover the wound with a band-aid, or a sterile non-stick gauze pad and micropore paper tape      REPEAT THESE INSTRUCTIONS AT LEAST ONCE A DAY UNTIL THE WOUND HAS COMPLETELY HEALED.    It is an old wives tale that a wound heals better when it is exposed to air and allowed to dry out. The wound will heal faster with a better cosmetic result if it is kept moist with ointment and covered with a bandage.    **Do not let the wound dry out.**      Supplies Needed:      *Cotton tipped applicators (Q-tips)    *Polysporin Ointment or Bacitracin Ointment (NOT NEOSPORIN)    *Band-aids or non-stick gauze pads and micropore paper tape.      PATIENT INFORMATION:    During the healing process you will notice a number of changes. All wounds develop a small halo of redness surrounding the wound.  This means healing is occurring. Severe itching with extensive redness usually indicates sensitivity to the ointment or bandage tape used to dress the wound.  You should call our office if this develops.      Swelling  and/or discoloration around your surgical site is common, particularly when performed around the eye.    All wounds normally drain.  The larger the wound the more drainage there will be.  After 7-10 days, you will notice the wound beginning to shrink and new skin will begin to grow.  The wound is healed when you can see skin has formed over the entire area.  A healed wound has a healthy, shiny look to the surface and is red to dark pink in color to normalize.   Wounds may take approximately 4-6 weeks to heal.  Larger wounds may take 6-8 weeks.  After the wound is healed you may discontinue dressing changes.    You may experience a sensation of tightness as your wound heals. This is normal and will gradually subside.    Your healed wound may be sensitive to temperature changes. This sensitivity improves with time, but if you re having a lot of discomfort, try to avoid temperature extremes.    Patients frequently experience itching after their wound appears to have healed because of the continue healing under the skin.  Plain Vaseline will help relieve the itching.        POSSIBLE COMPLICATIONS    BLEEDIN. Leave the bandage in place.  2. Use tightly rolled up gauze or a cloth to apply direct pressure over the bandage for 30  minutes.  3. Reapply pressure for an additional 30 minutes if necessary  4. Use additional gauze and tape to maintain pressure once the bleeding has stopped.        Proper skin care from [facility]:    -Eliminate harsh soaps as they strip the natural oils from the skin, often resulting in dry itchy skin ( i.e. Dial, Zest, [place] Spring)  -Use mild soaps such as Cetaphil or Dove Sensitive Skin in the shower. You do not need to use soap on arms, legs, and trunk every time you shower unless visibly soiled.   -Avoid hot or cold showers.  -After showering, lightly dry off and apply moisturizing within 2-3 minutes. This will help trap moisture in the skin.   -Aggressive use of a moisturizer at least 1-2 times a day to the entire body (including -Vanicream, Cetaphil, Aquaphor or Cerave) and moisturize hands after every washing.  -We recommend using moisturizers that come in a tub that needs to be scooped out, not a pump. This has more of an oil base. It will hold moisture in your skin much better than a water base moisturizer. The above recommended are non-pore clogging.      Wear a sunscreen with at least SPF 30 on your face, ears, neck and V  of the chest daily. Wear sunscreen on other areas of the body if those areas are exposed to the sun throughout the day. Sunscreens can contain physical and/or chemical blockers. Physical blockers are less likely to clog pores, these include zinc oxide and titanium dioxide. Reapply every two hour and after swimming.     Sunscreen examples: https://www.ewg.org/sunscreen/    UV radiation  UVA radiation remains constant throughout the day and throughout the year. It is a longer wavelength than UVB and therefore penetrates deeper into the skin leading to immediate and delayed tanning, photoaging, and skin cancer. 70-80% of UVA and UVB radiation occurs between the hours of 10am-2pm.  UVB radiation  UVB radiation causes the most harmful effects and is more significant during the summer months. However, snow and ice can reflect UVB radiation leading to skin damage during the winter months as well. UVB radiation is responsible for tanning, burning, inflammation, delayed erythema (pinkness), pigmentation (brown spots), and skin cancer.     I recommend self monthly full body exams and yearly full body exams with a dermatology provider. If you develop a new or changing lesion please follow up for examination. Most skin cancers are pink and scaly or pink and pearly. However, we do see blue/brown/black skin cancers.  Consider the ABCDEs of melanoma when giving yourself your monthly full body exam ( don't forget the groin, buttocks, feet, toes, etc). A-asymmetry, B-borders, C-color, D-diameter, E-elevation or evolving. If you see any of these changes please follow up in clinic. If you cannot see your back I recommend purchasing a hand held mirror to use with a larger wall mirror.

## 2021-08-12 NOTE — LETTER
8/12/2021         RE: Josafat Acevedo  [address]  [facility] [address]        Dear Colleague,    Thank you for referring your patient, Josafat Acevedo, to the [facility]. Please see a copy of my visit note below.    HPI:  Josafat Acevedo is a 46 year old male patient here today for rash on feet .  Patient states this has been present for months.  Patient reports the following symptoms: itchy .  Patient reports the following previous treatments: otc with no change.  Patient reports the following modifying factors: none.  Associated symptoms: none. Also has a growth on right cheek that bleeds and swells up. .  Patient has no other skin complaints today.  Remainder of the HPI, Meds, PMH, Allergies, FH, and SH was reviewed in chart.    Pertinent Hx:   No personal or family history of skin cancer    Past Medical History:   Diagnosis Date     Depressive disorder     Bi-Polar, Medicated     Hypertension        Past Surgical History:   Procedure Laterality Date     BIOPSY      Evaluating drug reaction to lamictal     COLONOSCOPY N/A 9/12/2019    Procedure: Colonoscopy, With Polypectomy And Biopsy;  Surgeon: Joel Bauer MD;  Location:  GI        Family History   Problem Relation Age of Onset     Allergies Mother      Diabetes Maternal Grandmother      Allergies Maternal Grandmother      Arthritis Maternal Grandmother      Cancer Maternal Grandmother         heart attack     Allergies Sister        Social History     Socioeconomic History     Marital status: Single     Spouse name: Not on file     Number of children: Not on file     Years of education: Not on file     Highest education level: Not on file   Occupational History     Not on file   Tobacco Use     Smoking status: Never Smoker     Smokeless tobacco: Never Used   Substance and Sexual Activity     Alcohol use: Yes     Alcohol/week: 0.0 standard drinks     Comment: 1-2 drinks per month     Drug use: No      Sexual activity: Yes     Partners: Female     Birth control/protection: Pill   Other Topics Concern      Service No     Blood Transfusions No     Caffeine Concern No     Occupational Exposure No     Hobby Hazards No     Sleep Concern No     Stress Concern Yes     Comment: work      Weight Concern Yes     Special Diet Yes     Comment: low sodium      Back Care No     Exercise Yes     Comment: try to      Bike Helmet Yes     Seat Belt Yes     Self-Exams Yes     Parent/sibling w/ CABG, MI or angioplasty before 65F 55M? No   Social History Narrative     Not on file     Social Determinants of Health     Financial Resource Strain:      Difficulty of Paying Living Expenses:    Food Insecurity:      Worried About Running Out of Food in the Last Year:      Ran Out of Food in the Last Year:    Transportation Needs:      Lack of Transportation (Medical):      Lack of Transportation (Non-Medical):    Physical Activity:      Days of Exercise per Week:      Minutes of Exercise per Session:    Stress:      Feeling of Stress :    Social Connections:      Frequency of Communication with Friends and Family:      Frequency of Social Gatherings with Friends and Family:      Attends Congregation Services:      Active Member of Clubs or Organizations:      Attends Club or Organization Meetings:      Marital Status:    Intimate Partner Violence:      Fear of Current or Ex-Partner:      Emotionally Abused:      Physically Abused:      Sexually Abused:        Outpatient Encounter Medications as of 8/12/2021   Medication Sig Dispense Refill     atorvastatin (LIPITOR) 40 MG tablet Take 40 mg by mouth       cyclobenzaprine (FLEXERIL) 5 MG tablet Take 1-2 tabs at night as needed for lumbar spasms affecting sleep (Patient not taking: Reported on 8/29/2019) 30 tablet 0     famotidine (PEPCID) 20 MG tablet Take 20 mg by mouth       famotidine (PEPCID) 20 MG tablet Take 1 tablet (20 mg) by mouth At Bedtime 30 tablet 5     fexofenadine  (ALLEGRA) 180 MG tablet Take 180 mg by mouth daily       fluticasone (FLONASE) 50 MCG/ACT nasal spray Spray 2 sprays in nostril daily 16 g 3     IBUPROFEN PO Take  by mouth.       lisinopril-hydrochlorothiazide (ZESTORETIC) 20-12.5 MG tablet TAKE 1 TABLET BY MOUTH ONE TIME DAILY 90 tablet 3     Facility-Administered Encounter Medications as of 8/12/2021   Medication Dose Route Frequency Provider Last Rate Last Admin     lidocaine 1 % injection 4 mL  4 mL   Shin Gee MD   4 mL at 11/06/18 1707     lidocaine 1 % injection 4 mL  4 mL   Shin Gee MD   4 mL at 11/06/18 1707     methylPREDNISolone acetate (DEPO-MEDROL) injection 40 mg  40 mg   Shin Gee MD   40 mg at 11/06/18 1707     methylPREDNISolone acetate (DEPO-MEDROL) injection 40 mg  40 mg   Sihn Gee MD   40 mg at 11/06/18 1707       Review Of Systems:  Skin: spot and rash  Eyes: negative  Ears/Nose/Throat: negative  Respiratory: No shortness of breath, dyspnea on exertion, cough, or hemoptysis  Cardiovascular: negative  Gastrointestinal: negative  Genitourinary: negative  Musculoskeletal: negative  Neurologic: negative  Psychiatric: negative  Hematologic/Lymphatic/Immunologic: negative  Endocrine: negative      Objective:     There were no vitals taken for this visit.  Eyes: Conjunctivae/lids: Normal   ENT: Lips:  Normal  MSK: Normal  Cardiovascular: Peripheral edema none  Pulm: Breathing Normal  Neuro/Psych: Orientation: A/O x 3. Normal; Mood/Affect: Normal, NAD, WDWN  Pt accompanied by: self  Following areas examined: face, back, right shoulder, feet, ankles  Teixeira skin type:ii   Findings:  Red smooth well-defined macules on back and right shoulder  Brown, stuck-on scaly appearing papules on back and right shoulder  Well circumscribed macules with symmetric color distribution on back  Smooth heme crusted flesh colored papule on right cheek 0.4cm  Pink scaly patches on feet  Yellow thickened  callus on heel and ball of feet    Assessment and Plan:     1) Cherry angiomas, Seborrheic keratoses, Benign nevi,     I discussed the specifics of tumor, prognosis, and genetics of benign lesions.  I explained that treatment of these lesions would be purely cosmetic and not medically neccessary.  I discussed with patient different removal options including excision, cryotherapy, cautery and /or laser.  Lesion may recur and/or may not completely resolve. May need additional treatment.     2) Neoplasm of uncertain behavior on right cheek 0.4cm  Rule out irritated nevus  TANGENTIAL BIOPSY:  After consent, anesthesia with [facility] and prep, tangential biopsy performed.  No complications and routine wound care.  May grow back and will get a scar. Based on lesion type may need to completely remove lesion. Patient will be notified in 7-10 days of results. Wound care directions given.    3)dermatitis and callus   koh neg  Begin urea 40% 1-2x a day to thickened area on feet.   Begin lidex 2 x a day to affected area on feet for 2-4 weeks. Do not use on face or body folds.   Side effects of topical steroids including but not limited to atrophy (skin thinning), striae (stretch marks) telangiectasias, steroid acne, and others. Do not apply to normal skin. Do not apply to discolored skin that does not have rash present.   Signs and Symptoms of non-melanoma skin cancer and ABCDEs of melanoma reviewed with patient. Patient encouraged to perform monthly self skin exams and educated on how to perform them. UV precautions reviewed with patient. Patient was asked about new or changing moles/lesions on body.   Wear a sunscreen with at least SPF 30 on your face, ears, neck and V of the chest daily. Wear sunscreen on other areas of the body if those areas are exposed to the sun throughout the day. Sunscreens can contain physical and/or chemical blockers. Physical blockers are less likely to clog pores, these include zinc oxide and titanium  dioxide. Reapply every two hour and after swimming.Sunscreen examples: https://www.ewg.org/sunscreen/    Proper skin care from [facility]:    -Eliminate harsh soaps as they strip the natural oils from the skin, often resulting in dry itchy skin ( i.e. Dial, Zest, [place] Spring)  -Use mild soaps such as Cetaphil or Dove Sensitive Skin in the shower. You do not need to use soap on arms, legs, and trunk every time you shower unless visibly soiled.   -Avoid hot or cold showers.  -After showering, lightly dry off and apply moisturizing within 2-3 minutes. This will help trap moisture in the skin.   -Aggressive use of a moisturizer at least 1-2 times a day to the entire body (including -Vanicream, Cetaphil, Aquaphor or Cerave) and moisturize hands after every washing.  -We recommend using moisturizers that come in a tub that needs to be scooped out, not a pump. This has more of an oil base. It will hold moisture in your skin much better than a water base moisturizer. The above recommended are non-pore clogging.         It was a pleasure speaking with Josafat Acevedo today.       Follow up in 4-6 weeks          Again, thank you for allowing me to participate in the care of your patient.        Sincerely,        Shi Sanchez PA-C

## 2021-08-13 RX ORDER — FLUOCINONIDE 0.5 MG/G
CREAM TOPICAL 2 TIMES DAILY
Qty: 60 G | Refills: 1 | Status: SHIPPED | OUTPATIENT
Start: 2021-08-13

## 2021-08-16 LAB
PATH REPORT.COMMENTS IMP SPEC: NORMAL
PATH REPORT.COMMENTS IMP SPEC: NORMAL
PATH REPORT.FINAL DX SPEC: NORMAL
PATH REPORT.GROSS SPEC: NORMAL
PATH REPORT.MICROSCOPIC SPEC OTHER STN: NORMAL
PATH REPORT.RELEVANT HX SPEC: NORMAL

## 2021-09-11 ENCOUNTER — HEALTH MAINTENANCE LETTER (OUTPATIENT)
Age: 46
End: 2021-09-11

## 2021-11-02 ENCOUNTER — OFFICE VISIT (OUTPATIENT)
Dept: FAMILY MEDICINE | Facility: CLINIC | Age: 46
End: 2021-11-02
Payer: COMMERCIAL

## 2021-11-02 VITALS — DIASTOLIC BLOOD PRESSURE: 60 MMHG | SYSTOLIC BLOOD PRESSURE: 142 MMHG

## 2021-11-02 DIAGNOSIS — L21.9 SEBORRHEIC DERMATITIS: ICD-10-CM

## 2021-11-02 DIAGNOSIS — L30.9 FOOT DERMATITIS: Primary | ICD-10-CM

## 2021-11-02 DIAGNOSIS — R21 RASH AND OTHER NONSPECIFIC SKIN ERUPTION: ICD-10-CM

## 2021-11-02 PROCEDURE — 99213 OFFICE O/P EST LOW 20 MIN: CPT | Performed by: PHYSICIAN ASSISTANT

## 2021-11-02 RX ORDER — KETOCONAZOLE 20 MG/G
CREAM TOPICAL 2 TIMES DAILY
Qty: 60 G | Refills: 1 | Status: SHIPPED | OUTPATIENT
Start: 2021-11-02

## 2021-11-02 RX ORDER — CLOBETASOL PROPIONATE 0.5 MG/G
OINTMENT TOPICAL 2 TIMES DAILY
Qty: 60 G | Refills: 2 | Status: SHIPPED | OUTPATIENT
Start: 2021-11-02 | End: 2022-10-17

## 2021-11-02 NOTE — PATIENT INSTRUCTIONS
Start vinegar soaks at least once daily, for 10-15 minutes. Soak feet in 50 % vinegar and 50% water.    Start clobetasol 0.05% ointment twice daily. Apply Urea cream all over foot twice daily.

## 2021-11-02 NOTE — LETTER
11/2/2021         RE: Josafat Acevedo  [id number]  [facility] [address]        Dear Colleague,    Thank you for referring your patient, Josafat Acevedo, to the [facility]. Please see a copy of my visit note below.    [facility] Dermatology Note  Encounter Date: Nov 2, 2021  Office Visit     Dermatology Problem List:  1. Foot Dermatitis  - Current rx: Clobetasol 0.05% ointment, Urea 40% cream, vinegar soaks  - KOH negative 8/12/21  2. Benign biopsies  - right cheek, intradermal melanocytic nevus s/p bx 8/12/21    ____________________________________________    Assessment & Plan:    # Foot dermatitis   Due to persistent nature, advised vinegar soaks in conjunction with clobetasol and Urea. Plan for close follow-up in 6-8 weeks to ensure this continues to heal.  - Recommended daily vinegar soaks with 50:50 a ratio of white vinegar and water x 10-15 min.  - Apply clobetasol 0.05% ointment BID and place socks on over top  - Restart Urea 40% ointment BID     # Seborrheic dermatitis, forehead.   - Start ketoconazole 2% cream BID     Procedures Performed:   None    Follow-up: 6 week(s) in-person, or earlier for new or changing lesions    Staff and Scribe:     Scribe Disclosure:  I, Sydnie Leung, am serving as a scribe to document services personally performed by Beata De León PA-C based on data collection and the provider's statements to me.     Provider Disclosure:   The documentation recorded by the scribe accurately reflects the services I personally performed and the decisions made by me.    All risks, benefits and alternatives were discussed with patient.  Patient is in agreement and understands the assessment and plan.  All questions were answered.  Sun Screen Education was given.   Return to Clinic in 6 weeks or sooner as needed.   Beata De León PA-C   [facility]    ____________________________________________    CC: Derm Problem (f/u on ongoing issues with painful cracks in feet)    HPI:  Mr. Josafat Acevedo is a(n) 46 year old male who presents today as a return patient for follow-up. The patient was last seen in dermatology on 8/12/21 by Shi Sanchez PA-C at which point she was advised to use Lidex and Urea 40% cream for treatment of dermatitis. Additionally, a lesion on her right cheek was biopsied and returned benign.     Today, the patient states that the calluses on his feet are improved, although there is still cracking on his feet. They can still be itching and painful at times, particularly with walking. He soaks in epsom salts once a week and soaks them in hot water every other day. He also applies fluocinonide daily then places socks on over top. He tried Urea cream, but has since stopped. He initially developed the cracking and calluses in April 2021.  He originally suspected a fungal infection, however this returned negative at his last visit. More recently, he has developed some elevated lesions on his hands. He denies any rashes on his elbow or upper buttocks. Although there is dry skin behind his ears.     Additionally, there is a red patch on his forehead that has been present for at least 25 years now. He suspects this is likely dermatitis. Occasionally he notices some pimples within.     Regarding family skin history, his younger sister has many allergies and struggles with recurrent rashes. He otherwise denies any other family skin history.     Labs Reviewed:  N/A    Physical Exam:  Vitals: BP (!) 142/60   SKIN: Focused examination of face and bilateral feet was performed.  - Pink plaques to the plantar surfaces of the feet with thick hyperkeratotic scale. Hyperkeratosis noted to the heals with superficial fissuring.   - Thin pink scaly soft plaque on the central forehead  - No other lesions of concern on areas examined.     Medications:  Current  Outpatient Medications   Medication     atorvastatin (LIPITOR) 40 MG tablet     cyclobenzaprine (FLEXERIL) 5 MG tablet     famotidine (PEPCID) 20 MG tablet     fexofenadine (ALLEGRA) 180 MG tablet     fluocinonide (LIDEX) 0.05 % external cream     fluticasone (FLONASE) 50 MCG/ACT nasal spray     IBUPROFEN PO     lisinopril-hydrochlorothiazide (ZESTORETIC) 20-12.5 MG tablet     urea (GORDONS UREA) 40 % external ointment     famotidine (PEPCID) 20 MG tablet     Current Facility-Administered Medications   Medication     lidocaine 1 % injection 4 mL     lidocaine 1 % injection 4 mL     methylPREDNISolone acetate (DEPO-MEDROL) injection 40 mg     methylPREDNISolone acetate (DEPO-MEDROL) injection 40 mg      Past Medical History:   Patient Active Problem List   Diagnosis     Essential hypertension, benign     Ankle instability     Ankle pain     CARDIOVASCULAR SCREENING; LDL GOAL LESS THAN 160     Body mass index 50.0-59.9, adult (H)     Bipolar 2 disorder (H)     Vitamin D deficiency     Elevated fasting glucose     Prediabetes     Morbid obesity (H)     Seasonal allergic rhinitis, unspecified trigger     Nasal congestion     Past Medical History:   Diagnosis Date     Depressive disorder     Bi-Polar, Medicated     Hypertension         CC Referred Self, MD  No address on file on close of this encounter.                        Again, thank you for allowing me to participate in the care of your patient.        Sincerely,        Beata De León PA-C

## 2021-11-02 NOTE — PROGRESS NOTES
[facility] Dermatology Note  Encounter Date: Nov 2, 2021  Office Visit     Dermatology Problem List:  1. Foot Dermatitis  - Current rx: Clobetasol 0.05% ointment, Urea 40% cream, vinegar soaks  - KOH negative 8/12/21  2. Benign biopsies  - right cheek, intradermal melanocytic nevus s/p bx 8/12/21    ____________________________________________    Assessment & Plan:    # Foot dermatitis   Due to persistent nature, advised vinegar soaks in conjunction with clobetasol and Urea. Plan for close follow-up in 6-8 weeks to ensure this continues to heal.  - Recommended daily vinegar soaks with 50:50 a ratio of white vinegar and water x 10-15 min.  - Apply clobetasol 0.05% ointment BID and place socks on over top  - Restart Urea 40% ointment BID     # Seborrheic dermatitis, forehead.   - Start ketoconazole 2% cream BID     Procedures Performed:   None    Follow-up: 6 week(s) in-person, or earlier for new or changing lesions    Staff and Scribe:     Scribe Disclosure:  I, Sydnie Leung, am serving as a scribe to document services personally performed by Beata De León PA-C based on data collection and the provider's statements to me.     Provider Disclosure:   The documentation recorded by the scribe accurately reflects the services I personally performed and the decisions made by me.    All risks, benefits and alternatives were discussed with patient.  Patient is in agreement and understands the assessment and plan.  All questions were answered.  Sun Screen Education was given.   Return to Clinic in 6 weeks or sooner as needed.   Beata De León PA-C   [facility]   ____________________________________________    CC: Derm Problem (f/u on ongoing issues with painful cracks in feet)    HPI:  Mr. Josafat Acevedo is a(n) 46 year old male who presents today as a return patient for follow-up. The patient was last seen in dermatology on 8/12/21 by Shi Sanchez PA-C at  which point she was advised to use Lidex and Urea 40% cream for treatment of dermatitis. Additionally, a lesion on her right cheek was biopsied and returned benign.     Today, the patient states that the calluses on his feet are improved, although there is still cracking on his feet. They can still be itching and painful at times, particularly with walking. He soaks in epsom salts once a week and soaks them in hot water every other day. He also applies fluocinonide daily then places socks on over top. He tried Urea cream, but has since stopped. He initially developed the cracking and calluses in April 2021.  He originally suspected a fungal infection, however this returned negative at his last visit. More recently, he has developed some elevated lesions on his hands. He denies any rashes on his elbow or upper buttocks. Although there is dry skin behind his ears.     Additionally, there is a red patch on his forehead that has been present for at least 25 years now. He suspects this is likely dermatitis. Occasionally he notices some pimples within.     Regarding family skin history, his younger sister has many allergies and struggles with recurrent rashes. He otherwise denies any other family skin history.     Labs Reviewed:  N/A    Physical Exam:  Vitals: BP (!) 142/60   SKIN: Focused examination of face and bilateral feet was performed.  - Pink plaques to the plantar surfaces of the feet with thick hyperkeratotic scale. Hyperkeratosis noted to the heals with superficial fissuring.   - Thin pink scaly soft plaque on the central forehead  - No other lesions of concern on areas examined.     Medications:  Current Outpatient Medications   Medication     atorvastatin (LIPITOR) 40 MG tablet     cyclobenzaprine (FLEXERIL) 5 MG tablet     famotidine (PEPCID) 20 MG tablet     fexofenadine (ALLEGRA) 180 MG tablet     fluocinonide (LIDEX) 0.05 % external cream     fluticasone (FLONASE) 50 MCG/ACT nasal spray     IBUPROFEN PO      lisinopril-hydrochlorothiazide (ZESTORETIC) 20-12.5 MG tablet     urea (GORDONS UREA) 40 % external ointment     famotidine (PEPCID) 20 MG tablet     Current Facility-Administered Medications   Medication     lidocaine 1 % injection 4 mL     lidocaine 1 % injection 4 mL     methylPREDNISolone acetate (DEPO-MEDROL) injection 40 mg     methylPREDNISolone acetate (DEPO-MEDROL) injection 40 mg      Past Medical History:   Patient Active Problem List   Diagnosis     Essential hypertension, benign     Ankle instability     Ankle pain     CARDIOVASCULAR SCREENING; LDL GOAL LESS THAN 160     Body mass index 50.0-59.9, adult (H)     Bipolar 2 disorder (H)     Vitamin D deficiency     Elevated fasting glucose     Prediabetes     Morbid obesity (H)     Seasonal allergic rhinitis, unspecified trigger     Nasal congestion     Past Medical History:   Diagnosis Date     Depressive disorder     Bi-Polar, Medicated     Hypertension         CC Referred Self, MD  No address on file on close of this encounter.

## 2021-11-29 DIAGNOSIS — L30.9 FOOT DERMATITIS: ICD-10-CM

## 2021-11-30 NOTE — TELEPHONE ENCOUNTER
Routing refill request to provider for review/approval because:  Drug not on the [facility] refill protocol   Paige HANCOCK RN  [facility]

## 2021-12-24 ENCOUNTER — IMMUNIZATION (OUTPATIENT)
Dept: NURSING | Facility: CLINIC | Age: 46
End: 2021-12-24
Payer: COMMERCIAL

## 2021-12-24 PROCEDURE — 90471 IMMUNIZATION ADMIN: CPT

## 2021-12-24 PROCEDURE — 0064A COVID-19,PF,MODERNA (18+ YRS BOOSTER .25ML): CPT

## 2021-12-24 PROCEDURE — 91306 COVID-19,PF,MODERNA (18+ YRS BOOSTER .25ML): CPT

## 2021-12-24 PROCEDURE — 90686 IIV4 VACC NO PRSV 0.5 ML IM: CPT

## 2022-01-01 ENCOUNTER — HEALTH MAINTENANCE LETTER (OUTPATIENT)
Age: 47
End: 2022-01-01

## 2022-01-04 ENCOUNTER — OFFICE VISIT (OUTPATIENT)
Dept: FAMILY MEDICINE | Facility: CLINIC | Age: 47
End: 2022-01-04
Payer: COMMERCIAL

## 2022-01-04 VITALS — SYSTOLIC BLOOD PRESSURE: 144 MMHG | DIASTOLIC BLOOD PRESSURE: 62 MMHG

## 2022-01-04 DIAGNOSIS — R21 RASH: Primary | ICD-10-CM

## 2022-01-04 PROCEDURE — 88312 SPECIAL STAINS GROUP 1: CPT | Mod: 26 | Performed by: DERMATOLOGY

## 2022-01-04 PROCEDURE — 88305 TISSUE EXAM BY PATHOLOGIST: CPT | Mod: 26 | Performed by: DERMATOLOGY

## 2022-01-04 PROCEDURE — 11104 PUNCH BX SKIN SINGLE LESION: CPT | Performed by: PHYSICIAN ASSISTANT

## 2022-01-04 RX ORDER — TRIAMCINOLONE ACETONIDE 1 MG/G
OINTMENT TOPICAL 2 TIMES DAILY
Qty: 80 G | Refills: 1 | Status: SHIPPED | OUTPATIENT
Start: 2022-01-04

## 2022-01-04 NOTE — PATIENT INSTRUCTIONS
Proper skin care from [facility]:    -Eliminate harsh soaps as they strip the natural oils from the skin, often resulting in dry itchy skin ( i.e. Dial, Zest, Genna Spring)  -Use mild soaps such as Cetaphil or Dove Sensitive Skin in the shower. You do not need to use soap on arms, legs, and trunk every time you shower unless visibly soiled.   -Avoid hot or cold showers.  -After showering, lightly dry off and apply moisturizing within 2-3 minutes. This will help trap moisture in the skin.   -Aggressive use of a moisturizer at least 1-2 times a day to the entire body (including -Vanicream, Cetaphil, Aquaphor or Cerave) and moisturize hands after every washing.  -We recommend using moisturizers that come in a tub that needs to be scooped out, not a pump. This has more of an oil base. It will hold moisture in your skin much better than a water base moisturizer. The above recommended are non-pore clogging.      Wear a sunscreen with at least SPF 30 on your face, ears, neck and V of the chest daily. Wear sunscreen on other areas of the body if those areas are exposed to the sun throughout the day. Sunscreens can contain physical and/or chemical blockers. Physical blockers are less likely to clog pores, these include zinc oxide and titanium dioxide. Reapply every two hour and after swimming.     Sunscreen examples: https://www.ewg.org/sunscreen/    UV radiation  UVA radiation remains constant throughout the day and throughout the year. It is a longer wavelength than UVB and therefore penetrates deeper into the skin leading to immediate and delayed tanning, photoaging, and skin cancer. 70-80% of UVA and UVB radiation occurs between the hours of 10am-2pm.  UVB radiation  UVB radiation causes the most harmful effects and is more significant during the summer months. However, snow and ice can reflect UVB radiation leading to skin damage during the winter months as well. UVB radiation is responsible for tanning,  burning, inflammation, delayed erythema (pinkness), pigmentation (brown spots), and skin cancer.     I recommend self monthly full body exams and yearly full body exams with a dermatology provider. If you develop a new or changing lesion please follow up for examination. Most skin cancers are pink and scaly or pink and pearly. However, we do see blue/brown/black skin cancers.  Consider the ABCDEs of melanoma when giving yourself your monthly full body exam ( don't forget the groin, buttocks, feet, toes, etc). A-asymmetry, B-borders, C-color, D-diameter, E-elevation or evolving. If you see any of these changes please follow up in clinic. If you cannot see your back I recommend purchasing a hand held mirror to use with a larger wall mirror.          Wound Care After a Biopsy    What is a skin biopsy?  A skin biopsy allows the doctor to examine a very small piece of tissue under the microscope to determine the diagnosis and the best treatment for the skin condition. A local anesthetic (numbing medicine)  is injected with a very small needle into the skin area to be tested. A small piece of skin is taken from the area. Sometimes a suture (stitch) is used.     What are the risks of a skin biopsy?  I will experience scar, bleeding, swelling, pain, crusting and redness. I may experience incomplete removal or recurrence. Risks of this procedure are excessive bleeding, bruising, infection, nerve damage, numbness, thick (hypertrophic or keloidal) scar and non-diagnostic biopsy.    How should I care for my wound for the first 24 hours?    Keep the wound dry and covered for 24 hours    If it bleeds, hold direct pressure on the area for 15 minutes. If bleeding does not stop then go to the emergency room    Avoid strenuous exercise the first 1-2 days or as your doctor instructs you    How should I care for the wound after 24 hours?    After 24 hours, remove the bandage    You may bathe or shower as normal    If you had a scalp  biopsy, you can shampoo as usual and can use shower water to clean the biopsy site daily    Clean the wound twice a day with gentle soap and water    Do not scrub, be gentle    Apply white petroleum/Vaseline after cleaning the wound with a cotton swab or a clean finger, and keep the site covered with a Bandaid /bandage. Bandages are not necessary with a scalp biopsy    If you are unable to cover the site with a Bandaid /bandage, re-apply ointment 2-3 times a day to keep the site moist. Moisture will help with healing    Avoid strenuous activity for first 1-2 days    Avoid lakes, rivers, pools, and oceans until the stitches are removed or the site is healed    How do I clean my wound?    Wash hands thoroughly with soap or use hand  before all wound care    Clean the wound with gentle soap and water    Apply white petroleum/Vaseline  to wound after it is clean    Replace the Bandaid /bandage to keep the wound covered for the first few days or as instructed by your doctor    If you had a scalp biopsy, warm shower water to the area on a daily basis should suffice    What should I use to clean my wound?     Cotton-tipped applicators (Qtips )    White petroleum jelly (Vaseline ). Use a clean new container and use Q-tips to apply.    Bandaids   as needed    Gentle soap     How should I care for my wound long term?    Do not get your wound dirty    Keep up with wound care for one week or until the area is healed.    A small scab will form and fall off by itself when the area is completely healed. The area will be red and will become pink in color as it heals. Sun protection is very important for how your scar will turn out. Sunscreen with an SPF 30 or greater is recommended once the area is healed.    If you have stitches, stitches need to be removed in 10-14 days. You may return to our clinic for this or you may have it done locally at your doctor s office.    You should have some soreness but it should be mild  and slowly go away over several days. Talk to your doctor about using tylenol for pain,    When should I call my doctor?  If you have increased:     Pain or swelling    Pus or drainage (clear or slightly yellow drainage is ok)    Temperature over 100F    Spreading redness or warmth around wound    When will I hear about my results?  The biopsy results can take 2 weeks to come back.  Your results will automatically release to [employer] before your provider has even reviewed them.  The clinic will call you with the results, send you a [employer] message, or have you schedule a follow-up clinic or phone time to discuss the results.  Contact our clinics if you do not hear from us in 2 weeks.    Who should I call with questions?    [facility]: [phone]    [facility]: [phone]    For urgent needs outside of business hours call the [facility] at [phone] and ask for the dermatology resident on call

## 2022-01-04 NOTE — LETTER
1/4/2022         RE: Josafat Acevedo  [address]  [place] [facility] [address]        Dear Colleague,    Thank you for referring your patient, Josafat Acevedo, to the [facility]. Please see a copy of my visit note below.    [facility] Dermatology Note  Encounter Date: Jan 4, 2022  Office Visit     Dermatology Problem List:  1. Evolving psoriasis vs dermatitis   - Current rx: Clobetasol 0.05% ointment, Urea 40% cream, and vinegar soaks for feet. TMC 0.1% ointment on body. Ketoconazole 2% cream on face.   - KOH negative 8/12/21  2. Benign biopsies  - right cheek, intradermal melanocytic nevus s/p bx 8/12/21    ____________________________________________    Assessment & Plan:    # Evolving psoriasis vs dermatitis    Punch biopsy to determine management. In meantime, continue using topical steroids and vinegar soaks on the feet and ketoconazole on the forehead.   - Start applying TMC 0.1% ointment to the affected areas on the medial thigh   - Continue applying ketoconazole 2% cream to the affected area on the forehead  - Continue using clobetasol 0.05% ointment BID, Urea 40% cream, and daily vinegar to the affected areas on the feet.        Procedures Performed:   - Punch biopsy procedure note, location(s): Left medial thigh. After discussion of benefits and risks including but not limited to bleeding, infection, scar, incomplete removal, recurrence, and non-diagnostic biopsy, written consent and photographs were obtained. The area was cleaned with isopropyl alcohol. 0.5mL of 1% lidocaine with epinephrine was injected to obtain adequate anesthesia and a 4 mm punch biopsy was performed at site(s). 4-0 Prolene sutures were utilized to approximate the epidermal edges. White petrolatum ointment and a bandage was applied to the wound. Explicit verbal and written wound care instructions were provided. The patient left the dermatology clinic in good condition.      Follow-up: 3 month(s) in-person, or earlier for new or changing lesions    Staff and Scribe:     Scribe Disclosure:  I, Sydnie Leung, am serving as a scribe to document services personally performed by Beata De León PA-C based on data collection and the provider's statements to me.       Provider Disclosure:   The documentation recorded by the scribe accurately reflects the services I personally performed and the decisions made by me.    All risks, benefits and alternatives were discussed with patient.  Patient is in agreement and understands the assessment and plan.  All questions were answered.  Return to Clinic in 3 months or sooner as needed.   Beata De León PA-C   [facility]   ____________________________________________    CC: Derm Problem (f/u dermatitis,)    HPI:  Mr. Josafat Acevedo is a(n) 46 year old male who presents today as a return patient for follow-up. The patient was last seen in dermatology by myself on 11/2/21 at which point he was advised to use vinegar soaks, clobetasol 0.05% ointment, and Urea 40% ointment for treatment of foot dermatitis. For seb derm, he was started on ketoconazole 2% cream.     Today, the patient states that he has been sparingly using the ketoconazole on his forehead. There has not been any improvement to the red patch there. It is not particularly bothersome for the patient.     However, in early December, he started developing a red patch on his inner left thigh. He has tried applying lotion and neosporin without much improvement. Although the lotion helps to prevent itching.     Regarding the scaling on his feet, he states that this is improved from previously while using the topical steroid and vinegar soaks. Walking is somewhat better. There is still some itching.     His younger sister has a history of severe allergies and potentially psoriasis. Patient is otherwise feeling well, without additional skin  concerns.    Labs Reviewed:  N/A    Physical Exam:  Vitals: BP (!) 144/62   SKIN: Focused examination of the lower extremities, feet, hands, and face was performed.  - Pink scaly plaques on the left medial thigh. Well defined.   - There is hyperkeratosis noted to the heals and slightly to the distal metatarsals.   - Scaly plaques on the right palm. Thin pink scaly plaques on the central forehead.   - No other lesions of concern on areas examined.                     Medications:  Current Outpatient Medications   Medication     atorvastatin (LIPITOR) 40 MG tablet     clobetasol (TEMOVATE) 0.05 % external ointment     cyclobenzaprine (FLEXERIL) 5 MG tablet     famotidine (PEPCID) 20 MG tablet     fexofenadine (ALLEGRA) 180 MG tablet     fluocinonide (LIDEX) 0.05 % external cream     fluticasone (FLONASE) 50 MCG/ACT nasal spray     IBUPROFEN PO     ketoconazole (NIZORAL) 2 % external cream     lisinopril-hydrochlorothiazide (ZESTORETIC) 20-12.5 MG tablet     urea (GORDONS UREA) 40 % external ointment     Current Facility-Administered Medications   Medication     lidocaine 1 % injection 4 mL     lidocaine 1 % injection 4 mL     methylPREDNISolone acetate (DEPO-MEDROL) injection 40 mg     methylPREDNISolone acetate (DEPO-MEDROL) injection 40 mg      Past Medical History:   Patient Active Problem List   Diagnosis     Essential hypertension, benign     Ankle instability     Ankle pain     CARDIOVASCULAR SCREENING; LDL GOAL LESS THAN 160     Body mass index 50.0-59.9, adult (H)     Bipolar 2 disorder (H)     Vitamin D deficiency     Elevated fasting glucose     Prediabetes     Morbid obesity (H)     Seasonal allergic rhinitis, unspecified trigger     Nasal congestion     Past Medical History:   Diagnosis Date     Depressive disorder     Bi-Polar, Medicated     Hypertension         CC No referring provider defined for this encounter. on close of this encounter.        Again, thank you for allowing me to participate in the  care of your patient.        Sincerely,        Beata De León PA-C

## 2022-01-04 NOTE — PROGRESS NOTES
[facility] Dermatology Note  Encounter Date: Jan 4, 2022  Office Visit     Dermatology Problem List:  1. Evolving psoriasis vs dermatitis   - Current rx: Clobetasol 0.05% ointment, Urea 40% cream, and vinegar soaks for feet. TMC 0.1% ointment on body. Ketoconazole 2% cream on face.   - KOH negative 8/12/21  2. Benign biopsies  - right cheek, intradermal melanocytic nevus s/p bx 8/12/21    ____________________________________________    Assessment & Plan:    # Evolving psoriasis vs dermatitis    Punch biopsy to determine management. In meantime, continue using topical steroids and vinegar soaks on the feet and ketoconazole on the forehead.   - Start applying TMC 0.1% ointment to the affected areas on the medial thigh   - Continue applying ketoconazole 2% cream to the affected area on the forehead  - Continue using clobetasol 0.05% ointment BID, Urea 40% cream, and daily vinegar to the affected areas on the feet.        Procedures Performed:   - Punch biopsy procedure note, location(s): Left medial thigh. After discussion of benefits and risks including but not limited to bleeding, infection, scar, incomplete removal, recurrence, and non-diagnostic biopsy, written consent and photographs were obtained. The area was cleaned with isopropyl alcohol. 0.5mL of 1% lidocaine with epinephrine was injected to obtain adequate anesthesia and a 4 mm punch biopsy was performed at site(s). 4-0 Prolene sutures were utilized to approximate the epidermal edges. White petrolatum ointment and a bandage was applied to the wound. Explicit verbal and written wound care instructions were provided. The patient left the dermatology clinic in good condition.     Follow-up: 3 month(s) in-person, or earlier for new or changing lesions    Staff and Scribe:     Scribe Disclosure:  Sydnie MARTINEZ, am serving as a scribe to document services personally performed by Beata De León PA-C based on data collection and the  provider's statements to me.       Provider Disclosure:   The documentation recorded by the scribe accurately reflects the services I personally performed and the decisions made by me.    All risks, benefits and alternatives were discussed with patient.  Patient is in agreement and understands the assessment and plan.  All questions were answered.  Return to Clinic in 3 months or sooner as needed.   Beata De León PA-C   [facility]   ____________________________________________    CC: Derm Problem (f/u dermatitis,)    HPI:  Mr. Josafat Acevedo is a(n) 46 year old male who presents today as a return patient for follow-up. The patient was last seen in dermatology by myself on 11/2/21 at which point he was advised to use vinegar soaks, clobetasol 0.05% ointment, and Urea 40% ointment for treatment of foot dermatitis. For seb derm, he was started on ketoconazole 2% cream.     Today, the patient states that he has been sparingly using the ketoconazole on his forehead. There has not been any improvement to the red patch there. It is not particularly bothersome for the patient.     However, in early December, he started developing a red patch on his inner left thigh. He has tried applying lotion and neosporin without much improvement. Although the lotion helps to prevent itching.     Regarding the scaling on his feet, he states that this is improved from previously while using the topical steroid and vinegar soaks. Walking is somewhat better. There is still some itching.     His younger sister has a history of severe allergies and potentially psoriasis. Patient is otherwise feeling well, without additional skin concerns.    Labs Reviewed:  N/A    Physical Exam:  Vitals: BP (!) 144/62   SKIN: Focused examination of the lower extremities, feet, hands, and face was performed.  - Pink scaly plaques on the left medial thigh. Well defined.   - There is hyperkeratosis noted to the heals and  slightly to the distal metatarsals.   - Scaly plaques on the right palm. Thin pink scaly plaques on the central forehead.   - No other lesions of concern on areas examined.                     Medications:  Current Outpatient Medications   Medication     atorvastatin (LIPITOR) 40 MG tablet     clobetasol (TEMOVATE) 0.05 % external ointment     cyclobenzaprine (FLEXERIL) 5 MG tablet     famotidine (PEPCID) 20 MG tablet     fexofenadine (ALLEGRA) 180 MG tablet     fluocinonide (LIDEX) 0.05 % external cream     fluticasone (FLONASE) 50 MCG/ACT nasal spray     IBUPROFEN PO     ketoconazole (NIZORAL) 2 % external cream     lisinopril-hydrochlorothiazide (ZESTORETIC) 20-12.5 MG tablet     urea (GORDONS UREA) 40 % external ointment     Current Facility-Administered Medications   Medication     lidocaine 1 % injection 4 mL     lidocaine 1 % injection 4 mL     methylPREDNISolone acetate (DEPO-MEDROL) injection 40 mg     methylPREDNISolone acetate (DEPO-MEDROL) injection 40 mg      Past Medical History:   Patient Active Problem List   Diagnosis     Essential hypertension, benign     Ankle instability     Ankle pain     CARDIOVASCULAR SCREENING; LDL GOAL LESS THAN 160     Body mass index 50.0-59.9, adult (H)     Bipolar 2 disorder (H)     Vitamin D deficiency     Elevated fasting glucose     Prediabetes     Morbid obesity (H)     Seasonal allergic rhinitis, unspecified trigger     Nasal congestion     Past Medical History:   Diagnosis Date     Depressive disorder     Bi-Polar, Medicated     Hypertension         CC No referring provider defined for this encounter. on close of this encounter.

## 2022-01-12 LAB
PATH REPORT.COMMENTS IMP SPEC: NORMAL
PATH REPORT.FINAL DX SPEC: NORMAL
PATH REPORT.GROSS SPEC: NORMAL
PATH REPORT.MICROSCOPIC SPEC OTHER STN: NORMAL
PATH REPORT.RELEVANT HX SPEC: NORMAL

## 2022-01-18 ENCOUNTER — ALLIED HEALTH/NURSE VISIT (OUTPATIENT)
Dept: NURSING | Facility: CLINIC | Age: 47
End: 2022-01-18
Payer: COMMERCIAL

## 2022-01-18 DIAGNOSIS — Z48.02 VISIT FOR SUTURE REMOVAL: Primary | ICD-10-CM

## 2022-01-18 DIAGNOSIS — L30.8 OTHER ECZEMA: Primary | ICD-10-CM

## 2022-01-18 PROCEDURE — 99207 PR NO CHARGE NURSE ONLY: CPT

## 2022-01-18 RX ORDER — TRIAMCINOLONE ACETONIDE 0.25 MG/G
OINTMENT TOPICAL 2 TIMES DAILY
Qty: 30 G | Refills: 1 | Status: SHIPPED | OUTPATIENT
Start: 2022-01-18

## 2022-01-18 NOTE — NURSING NOTE
Josafat Acevedo presents to the clinic today for removal of sutures.  The patient has had the sutures in place for 14 days.  There has been no history of infection or drainage.  2 sutures are seen located on the left thigh.  The wound is healing well with no signs of infection.  All sutures were easily removed today.  Routine wound care discussed.  The patient will follow up as needed.

## 2022-07-18 DIAGNOSIS — I10 ESSENTIAL HYPERTENSION, BENIGN: ICD-10-CM

## 2022-07-18 DIAGNOSIS — L30.9 FOOT DERMATITIS: ICD-10-CM

## 2022-07-20 RX ORDER — LISINOPRIL AND HYDROCHLOROTHIAZIDE 12.5; 2 MG/1; MG/1
TABLET ORAL
Qty: 90 TABLET | Refills: 0 | Status: SHIPPED | OUTPATIENT
Start: 2022-07-20 | End: 2022-08-18

## 2022-07-20 NOTE — TELEPHONE ENCOUNTER
Routing urea refill request to provider for review/approval because:  Drug not on the [facility] refill protocol     Routing lisnopril hydrochlorothiazide refill request to provider for review/approval because:  Patient needs to be seen because it has been more than 1 year since last office visit.    Last office vist: 2/18/21    Pended 30 day supply & 0 refills. Please Review.    Next office visit: none- routing to TC to assist wit scheduling, mychart sent back in march with no follow up .      Anni Benoit RN  [facility]

## 2022-08-18 ENCOUNTER — OFFICE VISIT (OUTPATIENT)
Dept: FAMILY MEDICINE | Facility: CLINIC | Age: 47
End: 2022-08-18
Payer: COMMERCIAL

## 2022-08-18 VITALS
WEIGHT: 315 LBS | HEIGHT: 70 IN | TEMPERATURE: 97.8 F | BODY MASS INDEX: 45.1 KG/M2 | HEART RATE: 94 BPM | RESPIRATION RATE: 20 BRPM | OXYGEN SATURATION: 97 % | SYSTOLIC BLOOD PRESSURE: 130 MMHG | DIASTOLIC BLOOD PRESSURE: 84 MMHG

## 2022-08-18 DIAGNOSIS — Z11.59 NEED FOR HEPATITIS C SCREENING TEST: ICD-10-CM

## 2022-08-18 DIAGNOSIS — Z12.5 SCREENING FOR PROSTATE CANCER: ICD-10-CM

## 2022-08-18 DIAGNOSIS — R73.9 ELEVATED BLOOD SUGAR: ICD-10-CM

## 2022-08-18 DIAGNOSIS — Z00.00 ENCOUNTER FOR ANNUAL PHYSICAL EXAM: ICD-10-CM

## 2022-08-18 DIAGNOSIS — E66.01 MORBID OBESITY (H): ICD-10-CM

## 2022-08-18 DIAGNOSIS — Z11.4 SCREENING FOR HIV (HUMAN IMMUNODEFICIENCY VIRUS): ICD-10-CM

## 2022-08-18 DIAGNOSIS — F31.81 BIPOLAR 2 DISORDER (H): ICD-10-CM

## 2022-08-18 DIAGNOSIS — E78.5 HYPERLIPIDEMIA LDL GOAL <130: ICD-10-CM

## 2022-08-18 DIAGNOSIS — I10 ESSENTIAL HYPERTENSION, BENIGN: Primary | ICD-10-CM

## 2022-08-18 PROCEDURE — 90471 IMMUNIZATION ADMIN: CPT | Performed by: FAMILY MEDICINE

## 2022-08-18 PROCEDURE — 86803 HEPATITIS C AB TEST: CPT | Performed by: FAMILY MEDICINE

## 2022-08-18 PROCEDURE — 99396 PREV VISIT EST AGE 40-64: CPT | Mod: 25 | Performed by: FAMILY MEDICINE

## 2022-08-18 PROCEDURE — 80061 LIPID PANEL: CPT | Performed by: FAMILY MEDICINE

## 2022-08-18 PROCEDURE — 87389 HIV-1 AG W/HIV-1&-2 AB AG IA: CPT | Performed by: FAMILY MEDICINE

## 2022-08-18 PROCEDURE — 90715 TDAP VACCINE 7 YRS/> IM: CPT | Performed by: FAMILY MEDICINE

## 2022-08-18 PROCEDURE — 80053 COMPREHEN METABOLIC PANEL: CPT | Performed by: FAMILY MEDICINE

## 2022-08-18 PROCEDURE — 36415 COLL VENOUS BLD VENIPUNCTURE: CPT | Performed by: FAMILY MEDICINE

## 2022-08-18 PROCEDURE — G0103 PSA SCREENING: HCPCS | Performed by: FAMILY MEDICINE

## 2022-08-18 RX ORDER — LISINOPRIL AND HYDROCHLOROTHIAZIDE 12.5; 2 MG/1; MG/1
TABLET ORAL
Qty: 90 TABLET | Refills: 3 | Status: SHIPPED | OUTPATIENT
Start: 2022-08-18 | End: 2023-07-20

## 2022-08-18 RX ORDER — RISPERIDONE 0.25 MG/1
TABLET ORAL
COMMUNITY
Start: 2022-07-24

## 2022-08-18 ASSESSMENT — ENCOUNTER SYMPTOMS
PARESTHESIAS: 0
HEARTBURN: 0
PALPITATIONS: 0
JOINT SWELLING: 0
CONSTIPATION: 0
NAUSEA: 0
COUGH: 0
HEMATURIA: 0
CHILLS: 0
EYE PAIN: 0
WEAKNESS: 0
FEVER: 0
FREQUENCY: 0
DYSURIA: 0
HEADACHES: 0
ABDOMINAL PAIN: 0
NERVOUS/ANXIOUS: 1
DIARRHEA: 0
DIZZINESS: 0
SORE THROAT: 0
SHORTNESS OF BREATH: 0
ARTHRALGIAS: 0
MYALGIAS: 0
HEMATOCHEZIA: 0

## 2022-08-18 ASSESSMENT — PATIENT HEALTH QUESTIONNAIRE - PHQ9
SUM OF ALL RESPONSES TO PHQ QUESTIONS 1-9: 18
10. IF YOU CHECKED OFF ANY PROBLEMS, HOW DIFFICULT HAVE THESE PROBLEMS MADE IT FOR YOU TO DO YOUR WORK, TAKE CARE OF THINGS AT HOME, OR GET ALONG WITH OTHER PEOPLE: VERY DIFFICULT
SUM OF ALL RESPONSES TO PHQ QUESTIONS 1-9: 18

## 2022-08-18 ASSESSMENT — PAIN SCALES - GENERAL: PAINLEVEL: NO PAIN (0)

## 2022-08-18 NOTE — PROGRESS NOTES
SUBJECTIVE:   CC: Josafat Acevedo is an 47 year old male who presents for preventative health visit.       Patient has been advised of split billing requirements and indicates understanding: Yes  Healthy Habits:     Getting at least 3 servings of Calcium per day:  Yes    Bi-annual eye exam:  Yes    Dental care twice a year:  Yes    Sleep apnea or symptoms of sleep apnea:  Daytime drowsiness    Diet:  Regular (no restrictions)    Frequency of exercise:  6-7 days/week    Duration of exercise:  45-60 minutes    Taking medications regularly:  Yes    PHQ-2 Total Score: 6    Additional concerns today:  Yes    Patient has history of morbid obesity BMI almost 60.  Please take blood pressure medication denies any concerns.    Today's PHQ-2 Score:   PHQ-2 ( 1999 [employer]) 8/18/2022   Q1: Little interest or pleasure in doing things 3   Q2: Feeling down, depressed or hopeless 3   PHQ-2 Score 6   PHQ-2 Total Score (12-17 Years)- Positive if 3 or more points; Administer PHQ-A if positive -   Q1: Little interest or pleasure in doing things Nearly every day   Q2: Feeling down, depressed or hopeless Nearly every day   PHQ-2 Score 6       Abuse: Current or Past(Physical, Sexual or Emotional)- No  Do you feel safe in your environment? Yes    Have you ever done Advance Care Planning? (For example, a Health Directive, POLST, or a discussion with a medical provider or your loved ones about your wishes): No, advance care planning information given to patient to review.  Patient declined advance care planning discussion at this time.    Social History     Tobacco Use     Smoking status: Never Smoker     Smokeless tobacco: Never Used   Substance Use Topics     Alcohol use: Yes     Comment: 1-2 drinks per month     If you drink alcohol do you typically have >3 drinks per day or >7 drinks per week? No    Alcohol Use 8/18/2022   Prescreen: >3 drinks/day or >7 drinks/week? No   Prescreen: >3 drinks/day or >7 drinks/week? -   No flowsheet data  "found.    Last PSA: No results found for: PSA    Reviewed orders with patient. Reviewed health maintenance and updated orders accordingly - Yes  Lab work is in process    Reviewed and updated as needed this visit by clinical staff   Tobacco  Allergies  Meds   Med Hx  Surg Hx  Fam Hx  Soc Hx          Reviewed and updated as needed this visit by Provider                       Review of Systems   Constitutional: Negative for chills and fever.   HENT: Negative for congestion, ear pain, hearing loss and sore throat.    Eyes: Negative for pain and visual disturbance.   Respiratory: Negative for cough and shortness of breath.    Cardiovascular: Negative for chest pain, palpitations and peripheral edema.   Gastrointestinal: Negative for abdominal pain, constipation, diarrhea, heartburn, hematochezia and nausea.   Genitourinary: Negative for dysuria, frequency, genital sores, hematuria, impotence, penile discharge and urgency.   Musculoskeletal: Negative for arthralgias, joint swelling and myalgias.   Skin: Positive for rash.   Neurological: Negative for dizziness, weakness, headaches and paresthesias.   Psychiatric/Behavioral: Negative for mood changes. The patient is nervous/anxious.        OBJECTIVE:   /84   Pulse 94   Temp 97.8  F (36.6  C) (Temporal)   Resp 20   Ht 1.778 m (5' 10\")   Wt (!) 191.4 kg (422 lb)   SpO2 97%   BMI 60.55 kg/m      Physical Exam  GENERAL: healthy, alert and no distress  EYES: Eyes grossly normal to inspection, PERRL and conjunctivae and sclerae normal  HENT: ear canals and TM's normal, nose and mouth without ulcers or lesions  NECK: no adenopathy, no asymmetry, masses, or scars and thyroid normal to palpation  RESP: lungs clear to auscultation - no rales, rhonchi or wheezes  CV: regular rate and rhythm, normal S1 S2, no S3 or S4, no murmur, click or rub, no peripheral edema and peripheral pulses strong  ABDOMEN: soft, nontender, no hepatosplenomegaly, no masses and bowel " sounds normal  MS: no gross musculoskeletal defects noted, no edema  SKIN: no suspicious lesions or rashes  NEURO: Normal strength and tone, mentation intact and speech normal  PSYCH: mentation appears normal, affect normal/bright    Diagnostic Test Results:  Labs reviewed in Epic    ASSESSMENT/PLAN:   Josafat was seen today for physical.    Diagnoses and all orders for this visit:    Essential hypertension, benign  -     Comprehensive metabolic panel; Future  -     Comprehensive metabolic panel  Blood pressure stable will refill the medications.  Encounter for annual physical exam  -     HIV Antigen Antibody Combo; Future  -     Hepatitis C Screen Reflex to HCV RNA Quant and Genotype; Future  -     Comprehensive metabolic panel; Future  -     Lipid Profile; Future  -     PSA, screen; Future  -     PSA, screen  -     Lipid Profile  -     Comprehensive metabolic panel  -     Hepatitis C Screen Reflex to HCV RNA Quant and Genotype  -     HIV Antigen Antibody Combo    Screening for HIV (human immunodeficiency virus)  -     HIV Antigen Antibody Combo; Future  -     HIV Antigen Antibody Combo    Need for hepatitis C screening test  -     Hepatitis C Screen Reflex to HCV RNA Quant and Genotype; Future  -     Hepatitis C Screen Reflex to HCV RNA Quant and Genotype    Morbid obesity (H)  Discussed obesity.  Advised some continuous exercise.  Bipolar 2 disorder (H)  Sees psychiatry.  Screening for prostate cancer  -     PSA, screen; Future  -     PSA, screen    Hyperlipidemia LDL goal <130  -     Comprehensive metabolic panel; Future  -     Lipid Profile; Future  -     Lipid Profile  -     Comprehensive metabolic panel    Other orders  -     REVIEW OF HEALTH MAINTENANCE PROTOCOL ORDERS  -     TDAP VACCINE (Adacel, Boostrix)          COUNSELING:   Reviewed preventive health counseling, as reflected in patient instructions       Regular exercise       Healthy diet/nutrition    Estimated body mass index is 60.55 kg/m  as  "calculated from the following:    Height as of this encounter: 1.778 m (5' 10\").    Weight as of this encounter: 191.4 kg (422 lb).         He reports that he has never smoked. He has never used smokeless tobacco.      Counseling Resources:  ATP IV Guidelines  Pooled Cohorts Equation Calculator  FRAX Risk Assessment  ICSI Preventive Guidelines  Dietary Guidelines for Americans, 2010  [employer]'s MyPlate  ASA Prophylaxis  Lung CA Screening    Andrey Stinson MD  [facility] [place]  Answers for HPI/ROS submitted by the patient on 8/18/2022  If you checked off any problems, how difficult have these problems made it for you to do your work, take care of things at home, or get along with other people?: Very difficult  PHQ9 TOTAL SCORE: 18      "

## 2022-08-19 ENCOUNTER — TELEPHONE (OUTPATIENT)
Dept: FAMILY MEDICINE | Facility: CLINIC | Age: 47
End: 2022-08-19

## 2022-08-19 LAB
ALBUMIN SERPL-MCNC: 3.7 G/DL (ref 3.4–5)
ALP SERPL-CCNC: 136 U/L (ref 40–150)
ALT SERPL W P-5'-P-CCNC: 99 U/L (ref 0–70)
ANION GAP SERPL CALCULATED.3IONS-SCNC: 11 MMOL/L (ref 3–14)
AST SERPL W P-5'-P-CCNC: 42 U/L (ref 0–45)
BILIRUB SERPL-MCNC: 1 MG/DL (ref 0.2–1.3)
BUN SERPL-MCNC: 11 MG/DL (ref 7–30)
CALCIUM SERPL-MCNC: 9.2 MG/DL (ref 8.5–10.1)
CHLORIDE BLD-SCNC: 97 MMOL/L (ref 94–109)
CHOLEST SERPL-MCNC: 222 MG/DL
CO2 SERPL-SCNC: 20 MMOL/L (ref 20–32)
CREAT SERPL-MCNC: 0.73 MG/DL (ref 0.66–1.25)
FASTING STATUS PATIENT QL REPORTED: ABNORMAL
GFR SERPL CREATININE-BSD FRML MDRD: >90 ML/MIN/1.73M2
GLUCOSE BLD-MCNC: 348 MG/DL (ref 70–99)
HCV AB SERPL QL IA: NONREACTIVE
HDLC SERPL-MCNC: 44 MG/DL
HIV 1+2 AB+HIV1 P24 AG SERPL QL IA: NONREACTIVE
LDLC SERPL CALC-MCNC: 144 MG/DL
NONHDLC SERPL-MCNC: 178 MG/DL
POTASSIUM BLD-SCNC: 3.7 MMOL/L (ref 3.4–5.3)
PROT SERPL-MCNC: 7.5 G/DL (ref 6.8–8.8)
PSA SERPL-MCNC: 0.27 UG/L (ref 0–4)
SODIUM SERPL-SCNC: 128 MMOL/L (ref 133–144)
TRIGL SERPL-MCNC: 168 MG/DL

## 2022-08-19 NOTE — TELEPHONE ENCOUNTER
Patient was called and was given providers response. Patient stated understanding and stated that he will make the lab appointment for the A1C. Patient stated that he will continue to watch his diet and increase exercise to lower his cholesterol numbers.    Kira Cornell RN  [facility] Triage Team

## 2022-08-19 NOTE — TELEPHONE ENCOUNTER
----- Message from Andrey Stinson MD sent at 8/19/2022  1:00 PM CDT -----  I have reviewed your labs.  Your cholesterol numbers LDL is slightly worse than before.  Watch your diet and do regular exercise.  Your blood sugar numbers are very elevated.  I have ordered additional test called hemoglobin A1c.  This will help us determine if you have underlying diabetes.  Please call at [phone] evaluated to have that blood work done.  Once done we will make some recommendation based on that.    Andrey Stinson MD

## 2022-10-16 DIAGNOSIS — L30.9 FOOT DERMATITIS: ICD-10-CM

## 2022-10-17 RX ORDER — CLOBETASOL PROPIONATE 0.5 MG/G
OINTMENT TOPICAL 2 TIMES DAILY
Qty: 60 G | Refills: 0 | Status: SHIPPED | OUTPATIENT
Start: 2022-10-17

## 2022-10-17 NOTE — TELEPHONE ENCOUNTER
Routing refill request to provider for review/approval because:  Drug not on the [facility] refill protocol   Kera Velázquez RN

## 2022-10-30 ENCOUNTER — HEALTH MAINTENANCE LETTER (OUTPATIENT)
Age: 47
End: 2022-10-30

## 2022-11-03 DIAGNOSIS — L30.9 FOOT DERMATITIS: ICD-10-CM

## 2022-11-04 RX ORDER — UREA 40 %
CREAM (GRAM) TOPICAL
Qty: 28.35 G | Refills: 0 | Status: SHIPPED | OUTPATIENT
Start: 2022-11-04

## 2023-07-20 DIAGNOSIS — I10 ESSENTIAL HYPERTENSION, BENIGN: ICD-10-CM

## 2023-07-20 RX ORDER — LISINOPRIL AND HYDROCHLOROTHIAZIDE 12.5; 2 MG/1; MG/1
TABLET ORAL
Qty: 90 TABLET | Refills: 0 | Status: SHIPPED | OUTPATIENT
Start: 2023-07-20

## 2023-07-24 NOTE — TELEPHONE ENCOUNTER
Spoke with Pt he has a new PCP no future refills needs    Case Thorne, Medical Assistant  [facility]

## 2025-08-06 ENCOUNTER — PATIENT OUTREACH (OUTPATIENT)
Dept: CARE COORDINATION | Facility: CLINIC | Age: 50
End: 2025-08-06
Payer: COMMERCIAL

## (undated) RX ORDER — FENTANYL CITRATE 50 UG/ML
INJECTION, SOLUTION INTRAMUSCULAR; INTRAVENOUS
Status: DISPENSED
Start: 2019-09-12

## (undated) RX ORDER — LIDOCAINE HYDROCHLORIDE 20 MG/ML
JELLY TOPICAL
Status: DISPENSED
Start: 2019-09-12